# Patient Record
Sex: FEMALE | Race: OTHER | Employment: OTHER | URBAN - METROPOLITAN AREA
[De-identification: names, ages, dates, MRNs, and addresses within clinical notes are randomized per-mention and may not be internally consistent; named-entity substitution may affect disease eponyms.]

---

## 2021-04-08 PROBLEM — N32.81 OVERACTIVE BLADDER: Status: ACTIVE | Noted: 2018-12-11

## 2021-04-08 PROBLEM — Z86.010 HISTORY OF ADENOMATOUS POLYP OF COLON: Status: ACTIVE | Noted: 2017-01-10

## 2022-02-23 ENCOUNTER — HOSPITAL ENCOUNTER (OUTPATIENT)
Dept: PHYSICAL THERAPY | Age: 67
Discharge: HOME | End: 2022-02-23
Payer: MEDICARE

## 2022-02-23 PROCEDURE — 97110 THERAPEUTIC EXERCISES: CPT

## 2022-02-23 PROCEDURE — 97161 PT EVAL LOW COMPLEX 20 MIN: CPT

## 2022-02-23 NOTE — PROGRESS NOTES
PHYSICAL THERAPY - DAILY TREATMENT NOTE     Patient Name: Kati Reynoso        Date: 2022  : 1955   YES Patient  Verified  Visit #:     Insurance: Payor: Nick Paula / Plan: [place] MEDICARE PART A & B / Product Type: Medicare /      In time: 1100 Out time: 1150   Total Treatment Time: 50     Medicare/BCBS Time Tracking (below)   Total Timed Codes (min):  15 1:1 Treatment Time:  40     TREATMENT AREA =  Pain in left shoulder [M25.512]    SUBJECTIVE    Pain Level (on 0 to 10 scale):  4  / 10   Medication Changes/New allergies or changes in medical history, any new surgeries or procedures?     NO    If yes, update Summary List   Subjective Functional Status/Changes:  []  No changes reported       See POC         OBJECTIVE  Modalities Rationale:     decrease inflammation and decrease pain to improve patient's ability to perform ADL's w/o pain      min [] Estim, type/location:                                      []  att     []  unatt     []  w/US     []  w/ice    []  w/heat    min []  Mechanical Traction: type/lbs                   []  pro   []  sup   []  int   []  cont    []  before manual    []  after manual    min []  Ultrasound, settings/location:      min []  Iontophoresis w/ dexamethasone, location:                                               []  take home patch       []  in clinic   10 min [x]  Ice     []  Heat    location/position: Supine to left shoulder    min []  Vasopneumatic Device, press/temp:     min []  Other:    [x] Skin assessment post-treatment (if applicable):    [x]  intact    [x]  redness- no adverse reaction     []redness - adverse reaction:      15 min Therapeutic Exercise:  [x]  See flow sheet   Rationale:      increase ROM and increase strength to improve the patients ability to perform ADL's w/o pain      Billed With/As:   [x] TE   [] TA   [] Neuro   [] Self Care Patient Education: [x] Review HEP    [] Progressed/Changed HEP based on:   [] positioning   [] body mechanics   [] transfers   [] heat/ice application    [] other:        Other Objective/Functional Measures:    See POC  See TE Flow sheet     Post Treatment Pain Level (on 0 to 10) scale:   1  / 10     ASSESSMENT    Assessment/Changes in Function:     See POC     []  See Progress Note/Recertification   Patient will continue to benefit from skilled PT services to modify and progress therapeutic interventions, address functional mobility deficits, address ROM deficits, address strength deficits, analyze and address soft tissue restrictions, analyze and cue movement patterns, analyze and modify body mechanics/ergonomics and assess and modify postural abnormalities to attain remaining goals.       Progress toward goals / Updated goals:    See POC     PLAN    [x]  Upgrade activities as tolerated YES Continue plan of care   []  Discharge due to :    []  Other:      Therapist: Connor Barnes PT    Date: 2/23/2022 Time: 10:51 AM     Future Appointments   Date Time Provider Trev Ragsdale   2/23/2022 11:00 AM Lala Urena, PT [facility]   8/10/2022  8:40 AM Virgilio Willoughby NP [id number]

## 2022-02-23 NOTE — PROGRESS NOTES
[address] [place] [address] PHYSICAL THERAPY AT [address] [place] [address]  [address] Mag[address]anes [address], [address] ,[address], [address]  Phone: [phone]  Fax: [phone] / [address] [address] PHYSICAL THERAPY SERVICES  Patient Name: Irene Lewis : 1955   Medical   Diagnosis: Pain in left shoulder [M25.512] Treatment Diagnosis: L shoulder   Onset Date: 2022     Referral Source: Juan C Sotelo MD Start of Care [facility]): 2022   Prior Hospitalization: See medical history Provider #: [id number]   Prior Level of Function: Independent all ADL's   Comorbidities: Depression, Arthritis, HTN   Medications: Verified on Patient Summary List   The Plan of Care and following information is based on the information from the initial evaluation.   ==================================================================================  Assessment / key information:  Patient is a 77 y.o.  yo female with Dx of Pain in left shoulder [M25.512]. She states insidious onset of pain in January with pain in superior, shoulder progressing to pain down the left arm and deep into the joint. She regularly sees a chiropractor who adjusted her first rib, with no relief. Received a cortisone injection on 22 along with an Xray, which was negative. She reports her pain and mobility have significantly improved since receiving the injection. She currently rates her pain as 7/10 at worst, 4/10 at best.  She complains of difficulty and increased pain with raking, lifting/carrying objects with L UE overhead activities, lying on left side. Objective Findings:  Shoulder A/PROM: is WNL in all planes with pain in the last 10 degrees of motion for all planes, MMT:4-4-/5 grossly throughout RTC and scapular stabilizers. Mild +TTP left pec minor. Special Test: Neer's and Tanna Bone Impingement Test: (+).   Patient can benefit from PT interventions to improve strength, ROM, posture, flexibility, decrease pain, to facilitate ADLs & overall functional status.   ==================================================================================  Eval Complexity: History HIGH Complexity :3+ comorbidities / personal factors will impact the outcome/ POC ;  Examination  MEDIUM Complexity : 3 Standardized tests and measures addressing body structure, function, activity limitation and / or participation in recreation ; Presentation LOW Complexity : Stable, uncomplicated ;  Decision Making MEDIUM Complexity : FOTO score of 26-74; Overall Complexity LOW   Problem List: pain affecting function, decrease ROM, decrease strength, decrease ADL/ functional abilitiies, decrease activity tolerance, decrease flexibility/ joint mobility and other FOTO 56/100  Treatment Plan may include any combination of the following: Therapeutic exercise, Therapeutic activities, Neuromuscular re-education, Physical agent/modality, Gait/balance training, Manual therapy, Patient education, Self Care training and Functional mobility training  Patient / Family readiness to learn indicated by: asking questions, trying to perform skills and interest  Persons(s) to be included in education: patient (P)  Barriers to Learning/Limitations: None  Measures taken:    Patient Goal (s): \"Return to previous full use of left shoulder to enable performing daily tasks and gardening, housekeeping, etc.\"   Patient self reported health status: good  Rehabilitation Potential: good   Short Term Goals: To be accomplished in  2-3  weeks:  1) Patient to be independent and compliant with initial HEP to prevent further disability. 2) Patient will report decreased c/o pain to < or = 3/10 at worst with overhead activities and lifting to facilitate improved independence with ADL's and yardwork with manageable sx in the left shoulder. 3) Patient to increase GROC by +2 indicating improved functional mobility and indepence   Long Term Goals:  To be accomplished in  4-6  weeks:  1) Patient to be independent & compliant with progressive HEP in preparation for D/C.  2) Patient to demonstrate 4+/5 or greater in left shoulder/scapula to allow for return to PLF w/ pain  3.) Patient will improve FOTO score to>/= to 69 indicating improved functional mobility and [place] with ADL's. Frequency / Duration:   Patient to be seen  2-3  times per week for 4-6  weeks:  Patient / Caregiver education and instruction: self care, activity modification and exercises  G-Codes (GP): n/a  Therapist Signature: Renan Escobar PT Date: 8/53/5534   Certification Period: 2/23/22 to 5/23/22 Time: 10:52 AM   ===========================================================================================  I certify that the above Physical Therapy Services are being furnished while the patient is under my care. I agree with the treatment plan and certify that this therapy is necessary. Physician Signature:        Date:       Time:        Jose Raul Harper MD  Please sign and return to [facility] [place] or you may fax the signed copy to [phone]. Thank you.

## 2022-03-02 ENCOUNTER — HOSPITAL ENCOUNTER (OUTPATIENT)
Dept: PHYSICAL THERAPY | Age: 67
Discharge: HOME | End: 2022-03-02
Payer: MEDICARE

## 2022-03-02 PROCEDURE — 97110 THERAPEUTIC EXERCISES: CPT

## 2022-03-02 PROCEDURE — 97140 MANUAL THERAPY 1/> REGIONS: CPT

## 2022-03-02 NOTE — PROGRESS NOTES
PHYSICAL THERAPY - DAILY TREATMENT NOTE     Patient Name: Corwin France        Date: 3/2/2022  : 1955   YES Patient  Verified  Visit #:     Insurance: Payor: Liza Nones / Plan: [place] MEDICARE PART A & B / Product Type: Medicare /      In time: 176 Out time: 930   Total Treatment Time: 60     Medicare/BCBS Time Tracking (below)   Total Timed Codes (min):  50 1:1 Treatment Time:  45     TREATMENT AREA =  Pain in left shoulder [M25.512]    SUBJECTIVE    Pain Level (on 0 to 10 scale):  3  / 10   Medication Changes/New allergies or changes in medical history, any new surgeries or procedures? NO    If yes, update Summary List   Subjective Functional Status/Changes:  []  No changes reported       Functional improvements: \"For the first few days since my last visit it felt great. The past day or two it has started to ache again but not as bad as before. \"  Functional impairments: Decreased strength and ROM affecting ADL's         OBJECTIVE  Modalities Rationale:     decrease inflammation and decrease pain to improve patient's ability to perform ADL's w less pain      min [] Estim, type/location:                                      []  att     []  unatt     []  w/US     []  w/ice    []  w/heat    min []  Mechanical Traction: type/lbs                   []  pro   []  sup   []  int   []  cont    []  before manual    []  after manual    min []  Ultrasound, settings/location:      min []  Iontophoresis w/ dexamethasone, location:                                               []  take home patch       []  in clinic   10 min [x]  Ice     []  Heat    location/position: Supine to L shoulder    min []  Vasopneumatic Device, press/temp:  If using vaso (only need to measure limb vaso being performed on)      pre-treatment girth :       post-treatment girth :       measured at (landmark location) :       min []  Other:    [x] Skin assessment post-treatment (if applicable):    [x]  intact    [x]  redness- no adverse reaction     []redness - adverse reaction:      35/5 min Therapeutic Exercise:  [x]  See flow sheet   Rationale:      increase ROM and increase strength to improve the patients ability to perform ADL's and overhead activities     10 min Manual Therapy: Technique:      [x] S/DTM []IASTM []PROM [] Passive Stretching   [x]manual TPR    []Jt manipulation:Gr I [] II []  III [] IV[] V[]  Treatment Area: Pec minor, [place]    Rationale:      decrease pain, increase ROM and increase tissue extensibility to improve patient's ability to perform ADL's w/o pain. The manual therapy interventions were performed at a separate and distinct time from the therapeutic activities interventions. Billed With/As:   [] TE   [] TA   [] Neuro   [] Self Care Patient Education: [x] Review HEP    [] Progressed/Changed HEP based on:   [] positioning   [] body mechanics   [] transfers   [] heat/ice application    [] other:        Other Objective/Functional Measures:    Initiated therex per flow sheet     Post Treatment Pain Level (on 0 to 10) scale:   1  / 10     ASSESSMENT    Assessment/Changes in Function:     Patient tolerated new therex well without increased symptoms and good control, minimal verbal cues for technique throughout session. Patient reports improved \"pinching\" sensation following treatment session. Significant improvement in soft tissue mobility and tightness following MT.     []  See Progress Note/Recertification   Patient will continue to benefit from skilled PT services to modify and progress therapeutic interventions, address functional mobility deficits, address ROM deficits, address strength deficits, analyze and address soft tissue restrictions, analyze and cue movement patterns, analyze and modify body mechanics/ergonomics and assess and modify postural abnormalities to attain remaining goals.       Progress toward goals / Updated goals:    Progressing towards newly established goals     PLAN    [x]  Upgrade activities as tolerated YES Continue plan of care   []  Discharge due to :    []  Other:      Therapist: Cinthya Farnco PT    Date: 3/2/2022 Time: 8:03 AM     Future Appointments   Date Time Provider Trev Ragsdale   3/2/2022  8:30 AM Renee Szymanski, PT [facility]   3/7/2022  8:30 AM Renee Szymanski, PT [facility]   3/9/2022  8:30 AM Renee Szymanski, PT [facility]   3/14/2022  8:30 AM Renee Szymanski, PT [facility]   3/16/2022  8:30 AM Renee Szymanski, PT [facility]   3/21/2022  8:30 AM Renee Szymanski, PT [facility]   3/23/2022  8:30 AM Renee Szymanski, PT [facility]   3/28/2022  8:30 AM Renee Szymanski, PT [facility]   3/30/2022  8:30 AM Renee Szymanski, PT [facility]   4/4/2022  8:30 AM Renee Szymanski, PT [facility]   4/6/2022  8:30 AM Renee Szymanski, PT [facility]   8/10/2022  8:40 AM Rama Babb, NP [address] [facility]

## 2022-03-07 ENCOUNTER — HOSPITAL ENCOUNTER (OUTPATIENT)
Dept: PHYSICAL THERAPY | Age: 67
Discharge: HOME | End: 2022-03-07
Payer: MEDICARE

## 2022-03-07 PROCEDURE — 97110 THERAPEUTIC EXERCISES: CPT

## 2022-03-07 PROCEDURE — 97112 NEUROMUSCULAR REEDUCATION: CPT

## 2022-03-07 PROCEDURE — 97140 MANUAL THERAPY 1/> REGIONS: CPT

## 2022-03-07 NOTE — PROGRESS NOTES
PHYSICAL THERAPY - DAILY TREATMENT NOTE     Patient Name: Maira Escobar        Date: 3/7/2022  : 1955   YES Patient  Verified  Visit #:   3   of   12  Insurance: Payor: Dakota Avina / Plan: [place] MEDICARE PART A & B / Product Type: Medicare /      In time: 230 Out time: 930   Total Treatment Time: 60     Medicare/BCBS Time Tracking (below)   Total Timed Codes (min):  60 1:1 Treatment Time:  50     TREATMENT AREA =  Pain in left shoulder [M25.512]    SUBJECTIVE    Pain Level (on 0 to 10 scale):  4  / 10   Medication Changes/New allergies or changes in medical history, any new surgeries or procedures? NO    If yes, update Summary List   Subjective Functional Status/Changes:  []  No changes reported       Functional improvements: \"I think I had a setback. On Thursday I was sitting at my desk and I pushed down on the arm rest to stand up and I heard a pop. I was not able to lift my arm at all for the entire day and I've had a lot more pain in the left shoulder since.   Functional impairments: Increased pain affecting ADL's and ROM         OBJECTIVE  Modalities Rationale:     decrease inflammation and decrease pain to improve patient's ability to perform ADL's w/ less pain      min [] Estim, type/location:                                      []  att     []  unatt     []  w/US     []  w/ice    []  w/heat    min []  Mechanical Traction: type/lbs                   []  pro   []  sup   []  int   []  cont    []  before manual    []  after manual    min []  Ultrasound, settings/location:      min []  Iontophoresis w/ dexamethasone, location:                                               []  take home patch       []  in clinic   10 min [x]  Ice     []  Heat    location/position: Supine to left shoulder    min []  Vasopneumatic Device, press/temp:  If using vaso (only need to measure limb vaso being performed on)      pre-treatment girth :       post-treatment girth :       measured at (landmark location) :       min []  Other:    [x] Skin assessment post-treatment (if applicable):    [x]  intact    [x]  redness- no adverse reaction     []redness - adverse reaction:      15 min Therapeutic Exercise:  [x]  See flow sheet   Rationale:      increase ROM and increase strength to improve the patients ability to perform ADL's and use of UE overhead     25 min Neuromuscular Re-ed: [x]  See flow sheet   Rationale:      increase ROM, increase strength and improve coordination to improve the patients ability to perform ADL's with good scapular control and stability when reaching away from body, especially with driving and overhead activities     10 min Manual Therapy: Technique:      [] S/DTM []IASTM [x]PROM [x] Passive Stretching   []manual TPR    [x]Jt manipulation:Gr I [] II []  III [x] IV[] V[]  Treatment Area: Post/inf capsule. PROM all planes    Rationale:      decrease pain, increase ROM and increase tissue extensibility to improve patient's ability to perform ADL's and reach overhead. The manual therapy interventions were performed at a separate and distinct time from the therapeutic activities interventions. Billed With/As:   [x] TE   [] TA   [x] Neuro   [] Self Care Patient Education: [x] Review HEP    [] Progressed/Changed HEP based on:   [] positioning   [] body mechanics   [] transfers   [] heat/ice application    [] other:        Other Objective/Functional Measures:    Assessed A/PROM: remains the same as initial eval with the exception of Active scaption pain with eccentric control from 55 deg to 0 and IR 55 deg P!. +TTP at supraspinatus    Following MT IR increased to 80 deg no pain     Post Treatment Pain Level (on 0 to 10) scale:   0  / 10     ASSESSMENT    Assessment/Changes in Function:     Significant improvement in active ROM w/o pain following treatment session. Patient able to perform all activities w/o symptoms and good scapular control/posture.      []  See Progress Note/Recertification   Patient will continue to benefit from skilled PT services to modify and progress therapeutic interventions, address functional mobility deficits, address ROM deficits, address strength deficits, analyze and address soft tissue restrictions, analyze and cue movement patterns, analyze and modify body mechanics/ergonomics and assess and modify postural abnormalities to attain remaining goals. Progress toward goals / Updated goals:    1) Patient to be independent and compliant with initial HEP to prevent further disability. Good progress, continues to require min vc's for technique  2) Patient will report decreased c/o pain to < or = 3/10 at worst with overhead activities and lifting to facilitate improved independence with ADL's and yardwork with manageable sx in the left shoulder. Good progress prior to most recent flare up  3) Patient to increase GROC by +2 indicating improved functional mobility and indepence Good progress per patient report despite recent flare up.      PLAN    [x]  Upgrade activities as tolerated YES Continue plan of care   []  Discharge due to :    []  Other:      Therapist: Max Fernandes PT    Date: 3/7/2022 Time: 7:59 AM     Future Appointments   Date Time Provider Trev Ragsdale   3/7/2022  8:30 AM Kya Ripa, PT [facility]   3/9/2022  8:30 AM Kya Ripa, PT [facility]   3/14/2022  8:30 AM Kya Ripa, PT [facility]   3/16/2022  8:30 AM Kya Ripa, PT [facility]   3/21/2022  8:30 AM Kya Ripa, PT [facility]   3/23/2022  8:30 AM Kya Ripa, PT [facility]   3/28/2022  8:30 AM Kya Ripa, PT [facility]   3/30/2022  8:30 AM Kya Ripa, PT [facility]   4/4/2022  8:30 AM Kya Ripa, PT [facility]   4/6/2022  8:30 AM Kya Ny, PT [facility]   8/10/2022  8:40 AM Baron Babb, NP [address] [employer]

## 2022-03-09 ENCOUNTER — HOSPITAL ENCOUNTER (OUTPATIENT)
Dept: PHYSICAL THERAPY | Age: 67
Discharge: HOME | End: 2022-03-09
Payer: MEDICARE

## 2022-03-09 PROCEDURE — 97530 THERAPEUTIC ACTIVITIES: CPT

## 2022-03-09 PROCEDURE — 97110 THERAPEUTIC EXERCISES: CPT

## 2022-03-09 PROCEDURE — 97140 MANUAL THERAPY 1/> REGIONS: CPT

## 2022-03-09 PROCEDURE — 97112 NEUROMUSCULAR REEDUCATION: CPT

## 2022-03-09 NOTE — PROGRESS NOTES
PHYSICAL THERAPY - DAILY TREATMENT NOTE     Patient Name: Harman Tran        Date: 3/9/2022  : 1955   YES Patient  Verified  Visit #:     Insurance: Payor: Dru Boogie / Plan: [place] MED[place] & B / Product Type: Medicare /      In time: 101 Out time: 176   Total Treatment Time: 65     Medicare/BCBS Time Tracking (below)   Total Timed Codes (min):  65 1:1 Treatment Time:  55     TREATMENT AREA =  Pain in left shoulder [M25.512]    SUBJECTIVE    Pain Level (on 0 to 10 scale):  0  / 10   Medication Changes/New allergies or changes in medical history, any new surgeries or procedures? NO    If yes, update Summary List   Subjective Functional Status/Changes:  []  No changes reported       Functional improvements: \"I had no pain yesterday at all.   I woke up with a little stiffness but no pain and I was able to do things around the house without difficulty\"  Functional impairments: Decreased strength affecting shoulder mechanics and ADL's         OBJECTIVE  Modalities Rationale:     decrease inflammation and decrease pain to improve patient's ability to perform ADL's      min [] Estim, type/location:                                      []  att     []  unatt     []  w/US     []  w/ice    []  w/heat    min []  Mechanical Traction: type/lbs                   []  pro   []  sup   []  int   []  cont    []  before manual    []  after manual    min []  Ultrasound, settings/location:      min []  Iontophoresis w/ dexamethasone, location:                                               []  take home patch       []  in clinic   10 min [x]  Ice     []  Heat    location/position: Supine to L shoulder    min []  Vasopneumatic Device, press/temp:  If using vaso (only need to measure limb vaso being performed on)      pre-treatment girth :       post-treatment girth :       measured at (landmark location) :       min []  Other:    [x] Skin assessment post-treatment (if applicable):    [x]  intact    [x] redness- no adverse reaction     []redness - adverse reaction:      15 min Therapeutic Exercise:  [x]  See flow sheet   Rationale:      increase ROM and increase strength to improve the patients ability to reach overhead     15 min Therapeutic Activity: [x]  See flow sheet   Rationale:      increase ROM, increase strength and improve coordination to improve the patients ability to reach overhead and dress without pain     15 min Neuromuscular Re-ed: [x]  See flow sheet   Rationale:      increase strength, improve coordination and increase proprioception to improve the patients ability to perform ADL's on extended UE with good scapular stability and proprioception. 10 min Manual Therapy: Technique:      [] S/DTM []IASTM [x]PROM [x] Passive Stretching   [x]manual TPR    [x]Jt manipulation:Gr I [] II []  III [x] IV[] V[]  Treatment Area:  PROM following jt mobs to inf/post capsule, TPR to supraspinatus   Rationale:      decrease pain, increase ROM and increase tissue extensibility to improve patient's ability to perform ADL's w/o pain and good shoulder mechanics. The manual therapy interventions were performed at a separate and distinct time from the therapeutic activities interventions. Billed With/As:   [] TE   [] TA   [] Neuro   [] Self Care Patient Education: [x] Review HEP    [] Progressed/Changed HEP based on:   [] positioning   [] body mechanics   [] transfers   [] heat/ice application    [] other:        Other Objective/Functional Measures: Added Supine KB rotation for scapular stabilization and increased weights/reps per flow sheet. Added standing scaption     Post Treatment Pain Level (on 0 to 10) scale:   0  / 10     ASSESSMENT    Assessment/Changes in Function:     Patient tolerated new therex well with only mild 'pinching pain' with fatigue but good technique and no substitution.   Patient continues to make excellent progress with ROM and strength allowing for improved functional mobility at home.     []  See Progress Note/Recertification   Patient will continue to benefit from skilled PT services to modify and progress therapeutic interventions, address functional mobility deficits, address ROM deficits, address strength deficits, analyze and address soft tissue restrictions, analyze and cue movement patterns, analyze and modify body mechanics/ergonomics and assess and modify postural abnormalities to attain remaining goals. Progress toward goals / Updated goals:    1) Patient to be independent and compliant with initial HEP to prevent further disability. MET  2) Patient will report decreased c/o pain to < or = 3/10 at worst with overhead activities and lifting to facilitate improved independence with ADL's and yardwork with manageable sx in the left shoulder.  MET  3) Patient to increase GROC by +2 indicating improved functional mobility and indepence MET per patient report of improved functional mobility     PLAN    [x]  Upgrade activities as tolerated YES Continue plan of care   []  Discharge due to :    []  Other:      Therapist: Evita Su PT    Date: 3/9/2022 Time: 8:03 AM     Future Appointments   Date Time Provider Trev Ragsdale   3/9/2022  8:30 AM Frank Tejas, PT [facility]   3/14/2022  8:30 AM Frank Tejas, PT [facility]   3/16/2022  8:30 AM Frank Tejas, PT [facility]   3/21/2022  8:30 AM Frank Tejas, PT [facility]   3/23/2022  8:30 AM Frank Tejas, PT [facility]   3/28/2022  8:30 AM Frank Tejas, PT [facility]   3/30/2022  8:30 AM Frank Tejas, PT [facility]   4/4/2022  8:30 AM Frank Tejas, PT [facility]   4/6/2022  8:30 AM Frank Tejas, PT [facility]   8/10/2022  8:40 AM [facility], FARTUN White

## 2022-03-14 ENCOUNTER — HOSPITAL ENCOUNTER (OUTPATIENT)
Dept: PHYSICAL THERAPY | Age: 67
Discharge: HOME | End: 2022-03-14
Payer: MEDICARE

## 2022-03-14 PROCEDURE — 97110 THERAPEUTIC EXERCISES: CPT

## 2022-03-14 PROCEDURE — 97530 THERAPEUTIC ACTIVITIES: CPT

## 2022-03-14 PROCEDURE — 97140 MANUAL THERAPY 1/> REGIONS: CPT

## 2022-03-14 PROCEDURE — 97112 NEUROMUSCULAR REEDUCATION: CPT

## 2022-03-14 NOTE — PROGRESS NOTES
PHYSICAL THERAPY - DAILY TREATMENT NOTE     Patient Name: Ed Chang        Date: 3/14/2022  : 1955   YES Patient  Verified  Visit #:      of   12  Insurance: Payor: Louise Yee / Plan: [place] MEDICARE PART A & B / Product Type: Medicare /      In time: 196 Out time: 774   Total Treatment Time: 65     Medicare/BCBS Time Tracking (below)   Total Timed Codes (min):  65 1:1 Treatment Time:  55     TREATMENT AREA =  Pain in left shoulder [M25.512]    SUBJECTIVE    Pain Level (on 0 to 10 scale):  0  / 10   Medication Changes/New allergies or changes in medical history, any new surgeries or procedures? NO    If yes, update Summary List   Subjective Functional Status/Changes:  []  No changes reported       Functional improvements: \"It's so much better. It feels stronger and I was able to do some yard work without any pain. \"  Functional impairments: Occasional pinching with reaching overhead and behind         OBJECTIVE  Modalities Rationale:     decrease inflammation and decrease pain to improve patient's ability to perform ADL's w/ less pain      min [] Estim, type/location:                                      []  att     []  unatt     []  w/US     []  w/ice    []  w/heat    min []  Mechanical Traction: type/lbs                   []  pro   []  sup   []  int   []  cont    []  before manual    []  after manual    min []  Ultrasound, settings/location:      min []  Iontophoresis w/ dexamethasone, location:                                               []  take home patch       []  in clinic   10 min [x]  Ice     []  Heat    location/position: Supine to L shoulder    min []  Vasopneumatic Device, press/temp:  If using vaso (only need to measure limb vaso being performed on)      pre-treatment girth :       post-treatment girth :       measured at (landmark location) :       min []  Other:    [x] Skin assessment post-treatment (if applicable):    [x]  intact    [x]  redness- no adverse reaction     []redness - adverse reaction:      15 min Therapeutic Exercise:  [x]  See flow sheet   Rationale:      increase ROM and increase strength to improve the patients ability to reach overhead w/ household activities. 15 min Therapeutic Activity: [x]  See flow sheet   Rationale:      increase ROM and increase strength to improve the patients ability to perform yard work with proper mechanics and decreased pain     15 min Neuromuscular Re-ed: [x]  See flow sheet   Rationale:      increase ROM, increase strength, improve coordination and increase proprioception to improve the patients ability to perform ADL's w/ proper mechanics and limited pain when reaching overhead. 10 min Manual Therapy: Technique:      [] S/DTM []IASTM [x]PROM [x] Passive Stretching   [x]manual TPR    []Jt manipulation:Gr I [] II []  III [] IV[] V[]  Treatment Area:  TPR to supraspinatus, PROM all planes   Rationale:      decrease pain, increase ROM and increase tissue extensibility to improve patient's ability to perform overhead reaching. .The manual therapy interventions were performed at a separate and distinct time from the therapeutic activities interventions. Billed With/As:   [x] TE   [x] TA   [x] Neuro   [] Self Care Patient Education: [x] Review HEP    [] Progressed/Changed HEP based on:   [] positioning   [] body mechanics   [] transfers   [] heat/ice application    [] other:        Other Objective/Functional Measures:    Progressed activities and weights per flow sheet     Post Treatment Pain Level (on 0 to 10) scale:   0  / 10     ASSESSMENT    Assessment/Changes in Function:     Patient continues to make excellent progress in her strength and active pain free ROM. Requires occasional vc's for scapular engagement with activities to decrease pain in the left shoulder.      []  See Progress Note/Recertification   Patient will continue to benefit from skilled PT services to modify and progress therapeutic interventions, address functional mobility deficits, address ROM deficits, address strength deficits, analyze and address soft tissue restrictions, analyze and cue movement patterns, analyze and modify body mechanics/ergonomics and assess and modify postural abnormalities to attain remaining goals. Progress toward goals / Updated goals: All STG's met  LT) Patient to demonstrate 4+/5 or greater in left shoulder/scapula to allow for return to PLF w/ pain Good progress noted by increased repetitions and tolerance to increased resistance.        PLAN    [x]  Upgrade activities as tolerated YES Continue plan of care   []  Discharge due to :    []  Other:      Therapist: Kalin Matthews PT    Date: 3/14/2022 Time: 8:04 AM     Future Appointments   Date Time Provider Trev Ragsdale   3/14/2022  8:30 AM Buzz Flatter, PT [facility]   3/16/2022  8:30 AM Buzz Flatter, PT [facility]   3/21/2022  8:30 AM Buzz Flatter, PT [facility]   3/23/2022  8:30 AM Buzz Flatter, PT [facility]   3/28/2022  8:30 AM Buzz Flatter, PT [facility]   3/30/2022  8:30 AM Buzz Flatter, PT [facility]   2022  8:30 AM Buzz Flatter, PT [facility]   2022  8:30 AM Buzz Flatter, PT [facility]   8/10/2022  8:40 AM Polizos, Arnold Leventhal, NP [place]

## 2022-03-16 ENCOUNTER — HOSPITAL ENCOUNTER (OUTPATIENT)
Dept: PHYSICAL THERAPY | Age: 67
Discharge: HOME | End: 2022-03-16
Payer: MEDICARE

## 2022-03-16 PROCEDURE — 97110 THERAPEUTIC EXERCISES: CPT

## 2022-03-16 PROCEDURE — 97140 MANUAL THERAPY 1/> REGIONS: CPT

## 2022-03-16 PROCEDURE — 97530 THERAPEUTIC ACTIVITIES: CPT

## 2022-03-16 NOTE — PROGRESS NOTES
PHYSICAL THERAPY - DAILY TREATMENT NOTE     Patient Name: Leatha Vernon        Date: 3/16/2022  : 1955   YES Patient  Verified  Visit #:     Insurance: Payor: Clau Hercules / Plan: [place] MEDICARE PART A & B / Product Type: Medicare /      In time: 861 Out time: 930   Total Treatment Time: 60     Medicare/BCBS Time Tracking (below)   Total Timed Codes (min):  60 1:1 Treatment Time:  50     TREATMENT AREA =  Pain in left shoulder [M25.512]    SUBJECTIVE    Pain Level (on 0 to 10 scale):  1  / 10   Medication Changes/New allergies or changes in medical history, any new surgeries or procedures? NO    If yes, update Summary List   Subjective Functional Status/Changes:  []  No changes reported       Functional improvements:  \"It felt good after Monday but it's been a little sore in my neck\"  Functional impairments: Increased pain, decreased strength affecting ADL's         OBJECTIVE  Modalities Rationale:     decrease inflammation and decrease pain to improve patient's ability to perform ADL's w/o pain      min [] Estim, type/location:                                      []  att     []  unatt     []  w/US     []  w/ice    []  w/heat    min []  Mechanical Traction: type/lbs                   []  pro   []  sup   []  int   []  cont    []  before manual    []  after manual    min []  Ultrasound, settings/location:      min []  Iontophoresis w/ dexamethasone, location:                                               []  take home patch       []  in clinic   10 min [x]  Ice     []  Heat    location/position: Supine to Left shoulder    min []  Vasopneumatic Device, press/temp:  If using vaso (only need to measure limb vaso being performed on)      pre-treatment girth :       post-treatment girth :       measured at (landmark location) :       min []  Other:    [x] Skin assessment post-treatment (if applicable):    [x]  intact    [x]  redness- no adverse reaction     []redness - adverse reaction:      15 min Therapeutic Exercise:  [x]  See flow sheet   Rationale:      increase ROM and increase strength to improve the patients ability to perform overhead activities     15 min Therapeutic Activity: [x]  See flow sheet   Rationale:      increase ROM and increase strength to improve the patients ability to perform dressing ADL's     10NB min Neuromuscular Re-ed: [x]  See flow sheet   Rationale:      increase ROM, increase strength and improve coordination to improve the patients ability to perform yard work     10 min Manual Therapy: Technique:      [x] S/DTM []IASTM []PROM [x] Passive Stretching   [x]manual TPR    [x]Jt manipulation:Gr I [] II []  III [x] IV[] V[]  Treatment Area:  TPR to left UT, post/inf jt mobs, followed by PROM to left shoulder   Rationale:      decrease pain, increase ROM and increase tissue extensibility to improve patient's ability to perform ADL's w/o pain. The manual therapy interventions were performed at a separate and distinct time from the therapeutic activities interventions. Billed With/As:   [] TE   [] TA   [] Neuro   [] Self Care Patient Education: [x] Review HEP    [] Progressed/Changed HEP based on:   [] positioning   [] body mechanics   [] transfers   [] heat/ice application    [] other:        Other Objective/Functional Measures:    Held standing ER/IR and scaption secondary to increased fatigue and over recruitment of UT with exercises/activities. Will resume n/v   Post Treatment Pain Level (on 0 to 10) scale:   0  / 10     ASSESSMENT    Assessment/Changes in Function:     Increased fatigue with therex and increased vc's for decreasing UT recruitment with standing shoulder activities.   Continues to make good gains in AROM and pain free activities at home.     []  See Progress Note/Recertification   Patient will continue to benefit from skilled PT services to modify and progress therapeutic interventions, address functional mobility deficits, address ROM deficits, address strength deficits, analyze and address soft tissue restrictions, analyze and cue movement patterns, analyze and modify body mechanics/ergonomics and assess and modify postural abnormalities to attain remaining goals.       Progress toward goals / Updated goals:    Good progress towards all goals     PLAN    [x]  Upgrade activities as tolerated YES Continue plan of care   []  Discharge due to :    [x]  Other: Reassess n/v     Therapist: Yudelka Muñoz PT    Date: 3/16/2022 Time: 8:05 AM     Future Appointments   Date Time Provider Trev Ragsdale   3/16/2022  8:30 AM Wilhelmena Owen, PT [facility]   3/21/2022  8:30 AM Wilhelmena Owen, PT [facility]   3/23/2022  8:30 AM Wilquocmena Owen, PT [facility]   3/28/2022  8:30 AM Wilhelmena Owen, PT [facility]   3/30/2022  8:30 AM Wilhelmena Owen, PT [facility]   4/4/2022  8:30 AM Wilhelmena Owen, PT [facility]   4/6/2022  8:30 AM Wilquocmena Owen, PT [facility]   8/10/2022  8:40 AM Nikia Babb, NP [id number]

## 2022-03-21 ENCOUNTER — HOSPITAL ENCOUNTER (OUTPATIENT)
Dept: PHYSICAL THERAPY | Age: 67
Discharge: HOME | End: 2022-03-21
Payer: MEDICARE

## 2022-03-21 PROCEDURE — 97140 MANUAL THERAPY 1/> REGIONS: CPT

## 2022-03-21 PROCEDURE — 97530 THERAPEUTIC ACTIVITIES: CPT

## 2022-03-21 PROCEDURE — 97112 NEUROMUSCULAR REEDUCATION: CPT

## 2022-03-21 PROCEDURE — 97110 THERAPEUTIC EXERCISES: CPT

## 2022-03-21 NOTE — PROGRESS NOTES
[address] [place] [address] PHYSICAL THERAPY AT [facility] [address] [facility] [facility], [place], [address] -   Phone: [phone]  Fax: [phone] OF CARE/RECERTIFICATION FOR PHYSICAL THERAPY          Patient Name: Ruy Griffiths : 1955   Treatment/Medical Diagnosis: Pain in left shoulder [M25.512]   Onset Date: 2022    Referral Source: Kurt Davis MD Start [facility]): 22   Prior Hospitalization: See Medical History Provider #: [id number]   Prior Level of Function: Independent all ADL's   Comorbidities: Depression, Arthritis, HTN   Medications: Verified on Patient Summary List   Visits from [facility]: 7 Missed Visits: 0     SUMMARY OF TREATMENT  Instruction in HEP, manual therapy involving joint mobilization and STM/TPR, Therex for strengthening and neuromuscular reducation, postural re education, modalities including cryotherapy. CURRENT STATUS  Ms. Kanchan Duong has made excellent gains in her physical therapy, consistently reporting improved functional mobility and decreased pain. She continues to report pain with overuse of the left shoulder and decreased endurance. She is able to achieve full pain free AROM following manual treatment and a 70% improvement in overall functional independence. She currently has 4-4+/5 in scapular and left shoulder strength. Previous Goals:  1) Patient to be independent and compliant with initial HEP to prevent further disability. 2) Patient will report decreased c/o pain to < or = 3/10 at worst with overhead activities and lifting to facilitate improved independence with ADL's and yardwork with manageable sx in the left shoulder. 3) Patient to increase GROC by +2 indicating improved functional mobility and indepence     Prior Level/Current Level:  1) Prior Level: N/A   Current Level: independent with current HEP   Goal Met? yes  2) Prior Level: 7/10   Current Level: 0-5/10   Goal Met?  Partially Met  3) Prior Level: n/a   Current Level: 70% overall improvement   Goal Met? yes      Problem List: pain affecting function, decrease ROM, decrease strength, decrease ADL/ functional abilitiies, decrease activity tolerance and decrease flexibility/ joint mobility   Treatment Plan may include any combination of the following: Therapeutic exercise, Therapeutic activities, Neuromuscular re-education, Physical agent/modality, Manual therapy, Patient education, Self Care training and Functional mobility training  Patient Goal(s) has been updated and includes:      Goals for this certification period include and are to be achieved in   2-4  weeks:  1) Patient to be independent & compliant with progressive HEP in preparation for D/C.  2) Patient to demonstrate 4+/5 or greater in left shoulder/scapula to allow for return to PLF w/ pain  3.) Patient will improve FOTO score to>/= to 69 indicating improved functional mobility and [place] with ADL's. Frequency / Duration:   Patient to be seen   2   times per week for   2-4    weeks:    Assessments/Recommendations: Recommend continued PT to allow patient to achieve all LTG's  If you have any questions/comments please contact us directly at [phone]. Thank you for allowing us to assist in the care of your patient.     Therapist Signature: Chaim Sykes PT Date: 8/29/2193   Certification Period:  Reporting Period: 3/21/22 to 4/22/21 2/23/22 to 3/21/22 Time: 8:01 AM   NOTE TO PHYSICIAN:  PLEASE COMPLETE THE ORDERS BELOW AND FAX TO   [facility] Physical Therapy: [phone]  If you are unable to process this request in 24 hours please contact our office: [phone]    ___ I have read the above report and request that my patient continue as recommended.   ___ I have read the above report and request that my patient continue therapy with the following changes/special instructions:_________________________________________________________   ___ I have read the above report and request that my patient be discharged from therapy.      Physician Signature:        Date:            Kayleigh Winters MD  Time:

## 2022-03-21 NOTE — PROGRESS NOTES
PHYSICAL THERAPY - DAILY TREATMENT NOTE     Patient Name: Ruy Res        Date: 3/21/2022  : 1955   YES Patient  Verified  Visit #:     Insurance: Payor: Pramod Vital / Plan: [place] MED[place]ARE PART [place] & B / Product Type: Medicare /      In time: 913 Out time: 145   Total Treatment Time: 65     Medicare/BCBS Time Tracking (below)   Total Timed Codes (min):  65 1:1 Treatment Time:  55     TREATMENT AREA =  Pain in left shoulder [M25.512]    SUBJECTIVE    Pain Level (on 0 to 10 scale):  2  / 10   Medication Changes/New allergies or changes in medical history, any new surgeries or procedures? NO    If yes, update Summary List   Subjective Functional Status/Changes:  []  No changes reported       Functional improvements: \"It's been really achy the last two days. Friday I did about 3 hours of trimming. \"  Functional impairments: Decreased endurance, increased pain with ADL's         OBJECTIVE  Modalities Rationale:     decrease inflammation and decrease pain to improve patient's ability to perform ADL's w/o pain      min [] Estim, type/location:                                      []  att     []  unatt     []  w/US     []  w/ice    []  w/heat    min []  Mechanical Traction: type/lbs                   []  pro   []  sup   []  int   []  cont    []  before manual    []  after manual    min []  Ultrasound, settings/location:      min []  Iontophoresis w/ dexamethasone, location:                                               []  take home patch       []  in clinic   10 min [x]  Ice     []  Heat    location/position: Supine to left shoulder    min []  Vasopneumatic Device, press/temp:  If using vaso (only need to measure limb vaso being performed on)      pre-treatment girth :       post-treatment girth :       measured at (landmark location) :       min []  Other:    [x] Skin assessment post-treatment (if applicable):    [x]  intact    [x]  redness- no adverse reaction     []redness - adverse reaction: 15 min Therapeutic Exercise:  [x]  See flow sheet   Rationale:      increase ROM and increase strength to improve the patients ability to perform dressing activities and reach overhead w/o pain     10 min Therapeutic Activity: [x]  See flow sheet   Rationale:      increase ROM and increase strength to improve the patients ability to perform yard work w/o pain     15 min Neuromuscular Re-ed: [x]  See flow sheet   Rationale:      increase strength, improve coordination and increase proprioception to improve the patients ability to perform yard work and overhead activities with proper mechanics and scapular stability     15 min Manual Therapy: Technique:      [x] S/DTM []IASTM []PROM [x] Passive Stretching   [x]manual TPR    [x]Jt manipulation:Gr I [] II []  III [x] IV[] V[]  Treatment Area:  TPR to left UT/supraspinatus, contract relax for IR, Post/inf mobs followed by PROM   Rationale:      decrease pain, increase ROM and increase tissue extensibility to improve patient's ability to perform ADL's w/ full ROM and no pain. The manual therapy interventions were performed at a separate and distinct time from the therapeutic activities interventions. Billed With/As:   [] TE   [] TA   [] Neuro   [] Self Care Patient Education: [x] Review HEP    [] Progressed/Changed HEP based on:   [] positioning   [] body mechanics   [] transfers   [] heat/ice application    [] other:        Other Objective/Functional Measures: Added sup hrz abd and PNF D2 in standing.   Resumed previous exercises     Post Treatment Pain Level (on 0 to 10) scale:   0  / 10     ASSESSMENT    Assessment/Changes in Function:     See Progress note     [x]  See Progress Note/Recertification   Patient will continue to benefit from skilled PT services to modify and progress therapeutic interventions, address functional mobility deficits, address ROM deficits, address strength deficits, analyze and address soft tissue restrictions, analyze and cue movement patterns, analyze and modify body mechanics/ergonomics and assess and modify postural abnormalities to attain remaining goals.       Progress toward goals / Updated goals:    See PN     PLAN    [x]  Upgrade activities as tolerated YES Continue plan of care   []  Discharge due to :    []  Other:      Therapist: Reina Henriquez PT    Date: 3/21/2022 Time: 7:58 AM     Future Appointments   Date Time Provider Trev Ragsdale   3/21/2022  8:30 AM Mercedes Delatorre, PT [facility]   3/23/2022  8:30 AM Mercedes Delatorre, PT [facility]   3/28/2022  8:30 AM Mercedes Delatorre, PT [facility]   3/30/2022  8:30 AM Mercedes Delatorre, PT [facility]   4/4/2022  8:30 AM Mercedes Delatorre, PT [facility]   4/6/2022  8:30 AM Mercedes Delatorre, PT [facility]   8/10/2022  8:40 AM Yessica Babb, NP [address] [facility]

## 2022-03-23 ENCOUNTER — HOSPITAL ENCOUNTER (OUTPATIENT)
Dept: PHYSICAL THERAPY | Age: 67
Discharge: HOME | End: 2022-03-23
Payer: MEDICARE

## 2022-03-23 PROCEDURE — 97530 THERAPEUTIC ACTIVITIES: CPT

## 2022-03-23 PROCEDURE — 97112 NEUROMUSCULAR REEDUCATION: CPT

## 2022-03-23 PROCEDURE — 97110 THERAPEUTIC EXERCISES: CPT

## 2022-03-23 PROCEDURE — 97140 MANUAL THERAPY 1/> REGIONS: CPT

## 2022-03-23 NOTE — PROGRESS NOTES
PHYSICAL THERAPY - DAILY TREATMENT NOTE     Patient Name: Kati Reynoso        Date: 3/23/2022  : 1955   YES Patient  Verified  Visit #:     Insurance: Payor: Nick Paula / Plan: [place] MEDICARE PART A & B / Product Type: Medicare /      In time: 193 Out time: 683   Total Treatment Time: 65     Medicare/BCBS Time Tracking (below)   Total Timed Codes (min):  65 1:1 Treatment Time:  55     TREATMENT AREA =  Pain in left shoulder [M25.512]    SUBJECTIVE    Pain Level (on 0 to 10 scale):  1  / 10   Medication Changes/New allergies or changes in medical history, any new surgeries or procedures? NO    If yes, update Summary List   Subjective Functional Status/Changes:  []  No changes reported       Functional improvements: \"I took breaks yesterday when doing yard work and it really helped. I didn't have pain, just workout soreness. \"  Functional impairments: Decreased strength affecting endurance and shoulder mechanics with ADL's         OBJECTIVE  Modalities Rationale:     decrease inflammation and decrease pain to improve patient's ability to perform ADL's w/o pain      min [] Estim, type/location:                                      []  att     []  unatt     []  w/US     []  w/ice    []  w/heat    min []  Mechanical Traction: type/lbs                   []  pro   []  sup   []  int   []  cont    []  before manual    []  after manual    min []  Ultrasound, settings/location:      min []  Iontophoresis w/ dexamethasone, location:                                               []  take home patch       []  in clinic   10 min [x]  Ice     []  Heat    location/position: Supine to left shoulder    min []  Vasopneumatic Device, press/temp:  If using vaso (only need to measure limb vaso being performed on)      pre-treatment girth :       post-treatment girth :       measured at (landmark location) :       min []  Other:    [x] Skin assessment post-treatment (if applicable):    [x]  intact    [x]  redness- no adverse reaction     []redness - adverse reaction:      15 min Therapeutic Exercise:  [x]  See flow sheet   Rationale:      increase ROM and increase strength to improve the patients ability to reach overhead with ADL's using proper shoulder mechanics and no compensatory movements     15 min Therapeutic Activity: [x]  See flow sheet   Rationale:      increase ROM, increase strength and improve coordination to improve the patients ability to perform ADL's and yard work without compensatory movements     15 min Neuromuscular Re-ed: [x]  See flow sheet   Rationale:      increase strength and improve coordination to improve the patients ability to perform ADL's w/o pain     10 min Manual Therapy: Technique:      [x] S/DTM []IASTM []PROM [x] Passive Stretching   [x]manual TPR    [x]Jt manipulation:Gr I [] II []  III [x] IV[] V[]  Treatment Area:  STM to UT/supraspinatus, contract relax for IR, post/inf jt capsule   Rationale:      decrease pain, increase ROM and increase tissue extensibility to improve patient's ability to perform overhead activities . The manual therapy interventions were performed at a separate and distinct time from the therapeutic activities interventions. Billed With/As:   [x] TE   [x] TA   [x] Neuro   [] Self Care Patient Education: [x] Review HEP    [] Progressed/Changed HEP based on:   [] positioning   [] body mechanics   [] transfers   [] heat/ice application    [] other:        Other Objective/Functional Measures:    Progressed resistance and reps per flow sheet. Post Treatment Pain Level (on 0 to 10) scale:   0  / 10     ASSESSMENT    Assessment/Changes in Function:     Improvement noted in soft tissue tightness and decreased verbal cues for UT recruitment with shoulder activities.   Increased endurance noted with all activities     []  See Progress Note/Recertification   Patient will continue to benefit from skilled PT services to modify and progress therapeutic interventions, address functional mobility deficits, address ROM deficits, address strength deficits, analyze and address soft tissue restrictions, analyze and cue movement patterns, analyze and modify body mechanics/ergonomics and assess and modify postural abnormalities to attain remaining goals.       Progress toward goals / Updated goals:    Progressing towards recently updated goals     PLAN    [x]  Upgrade activities as tolerated YES Continue plan of care   []  Discharge due to :    []  Other:      Therapist: Niranjan Schaffer PT    Date: 3/23/2022 Time: 8:00 AM     Future Appointments   Date Time Provider Trev Ragsdale   3/23/2022  8:30 AM Pau Mcfadden, PT [facility]   3/28/2022  8:30 AM Pau Mcfadden PT [facility]   3/30/2022  8:30 AM Pau Mcfadden PT [facility]   4/4/2022  8:30 AM Pau Mcfadden PT [facility]   4/6/2022  8:30 AM Pau Mcfadden PT [facility]   8/10/2022  8:40 AM Mona Gardner NP [address] [facility]

## 2022-03-28 ENCOUNTER — HOSPITAL ENCOUNTER (OUTPATIENT)
Dept: PHYSICAL THERAPY | Age: 67
Discharge: HOME | End: 2022-03-28
Payer: MEDICARE

## 2022-03-28 PROCEDURE — 97110 THERAPEUTIC EXERCISES: CPT

## 2022-03-28 PROCEDURE — 97530 THERAPEUTIC ACTIVITIES: CPT

## 2022-03-28 PROCEDURE — 97112 NEUROMUSCULAR REEDUCATION: CPT

## 2022-03-28 PROCEDURE — 97140 MANUAL THERAPY 1/> REGIONS: CPT

## 2022-03-28 NOTE — PROGRESS NOTES
PHYSICAL THERAPY - DAILY TREATMENT NOTE     Patient Name: Jg Haynes        Date: 3/28/2022  : 1955   YES Patient  Verified  Visit #:     Insurance: Payor: Sandra Aguayo / Plan: [place] MEDICARE PART A & B / Product Type: Medicare /      In time: 292 Out time: 177   Total Treatment Time: 65     Medicare/BCBS Time Tracking (below)   Total Timed Codes (min):  65 1:1 Treatment Time:  55     TREATMENT AREA =  Pain in left shoulder [M25.512]    SUBJECTIVE    Pain Level (on 0 to 10 scale):  0  / 10   Medication Changes/New allergies or changes in medical history, any new surgeries or procedures? NO    If yes, update Summary List   Subjective Functional Status/Changes:  []  No changes reported       Functional improvements: \"I have noticed complete inactivity makes the shoulder ache. I'm getting better at remembering to take breaks which is helping\"  Functional impairments: decreased strength affecting ADL's and endurance.          OBJECTIVE  Modalities Rationale:     decrease inflammation and decrease pain to improve patient's ability to perform ADL's w/o pain      min [] Estim, type/location:                                      []  att     []  unatt     []  w/US     []  w/ice    []  w/heat    min []  Mechanical Traction: type/lbs                   []  pro   []  sup   []  int   []  cont    []  before manual    []  after manual    min []  Ultrasound, settings/location:      min []  Iontophoresis w/ dexamethasone, location:                                               []  take home patch       []  in clinic   10 min [x]  Ice     []  Heat    location/position: Supine to left shoulder following treatment    min []  Vasopneumatic Device, press/temp:  If using vaso (only need to measure limb vaso being performed on)      pre-treatment girth :       post-treatment girth :       measured at (landmark location) :       min []  Other:    [x] Skin assessment post-treatment (if applicable):    [x]  intact [x]  redness- no adverse reaction     []redness - adverse reaction:       15 min Therapeutic Exercise:  [x]? See flow sheet   Rationale:      increase ROM and increase strength to improve the patients ability to reach overhead with ADL's using proper shoulder mechanics and no compensatory movements      15 min Therapeutic Activity: [x]? See flow sheet   Rationale:      increase ROM, increase strength and improve coordination to improve the patients ability to perform ADL's and yard work without compensatory movements      15 min Neuromuscular Re-ed: [x]? See flow sheet   Rationale:      increase strength and improve coordination to improve the patients ability to perform ADL's w/o pain     10 min Manual Therapy: Technique:      [x] S/DTM []IASTM []PROM [x] Passive Stretching   []manual TPR    [x]Jt manipulation:Gr I [] II [x]  III [x] IV[] V[]  Treatment Area:  STM to UT/supraspinatus, contract relax for IR, post/inf jt capsule   Rationale:      decrease pain, increase ROM and increase tissue extensibility to improve patient's ability to perform ADL's w/o pain. The manual therapy interventions were performed at a separate and distinct time from the therapeutic activities interventions. Billed With/As:   [x] TE   [x] TA   [x] Neuro   [] Self Care Patient Education: [x] Review HEP    [] Progressed/Changed HEP based on:   [] positioning   [] body mechanics   [] transfers   [] heat/ice application    [] other:        Other Objective/Functional Measures:    Progressed per flow sheet     Post Treatment Pain Level (on 0 to 10) scale:   0  / 10     ASSESSMENT    Assessment/Changes in Function:     Patient continues to make excellent gains in strength and shoulder mechanics, with reduced UT recruitment and increased endurance.      []  See Progress Note/Recertification   Patient will continue to benefit from skilled PT services to modify and progress therapeutic interventions, address functional mobility deficits, address ROM deficits, address strength deficits, analyze and address soft tissue restrictions, analyze and cue movement patterns, analyze and modify body mechanics/ergonomics and assess and modify postural abnormalities to attain remaining goals. Progress toward goals / Updated goals:    1) Patient to be independent & compliant with progressive HEP in preparation for D/C. Good Progress  2) Patient to demonstrate 4+/5 or greater in left shoulder/scapula to allow for return to [facility] w/ pain. Good Progress noted by increased resistance and endurance  3.) Patient will improve FOTO score to>/= to 69 indicating improved functional mobility and [place] with ADL's. Good Progress as noted by verbal report of increased activity tolerance     PLAN    [x]  Upgrade activities as tolerated YES Continue plan of care   []  Discharge due to :    []  Other:      Therapist: Gillian Daniels PT    Date: 3/28/2022 Time: 8:00 AM     Future Appointments   Date Time Provider Trev Ragsdale   3/28/2022  8:30 AM Claudean Bennetts, PT [facility]   3/30/2022  8:30 AM Claudean Bennetts, PT [facility]   4/4/2022  8:30 AM Claudean Bennetts, PT [facility]   4/6/2022  8:30 AM Claudean Bennetts, PT [facility]   8/10/2022  8:40 AM Elham Babb, [facility]

## 2022-03-30 ENCOUNTER — HOSPITAL ENCOUNTER (OUTPATIENT)
Dept: PHYSICAL THERAPY | Age: 67
Discharge: HOME | End: 2022-03-30
Payer: MEDICARE

## 2022-03-30 PROCEDURE — 97110 THERAPEUTIC EXERCISES: CPT

## 2022-03-30 PROCEDURE — 97530 THERAPEUTIC ACTIVITIES: CPT

## 2022-03-30 PROCEDURE — 97112 NEUROMUSCULAR REEDUCATION: CPT

## 2022-03-30 PROCEDURE — 97140 MANUAL THERAPY 1/> REGIONS: CPT

## 2022-03-30 NOTE — PROGRESS NOTES
PHYSICAL THERAPY - DAILY TREATMENT NOTE     Patient Name: Volodymyr Londono        Date: 3/30/2022  : 1955   YES Patient  Verified  Visit #:   10   of   12  Insurance: Payor: Shyann Arellano / Plan: [place] MEDICARE PART A & B / Product Type: Medicare /      In time: 825 Out time: 930   Total Treatment Time: 65     Medicare/BCBS Time Tracking (below)   Total Timed Codes (min):  65 1:1 Treatment Time:  55     TREATMENT AREA =  Pain in left shoulder [M25.512]    SUBJECTIVE    Pain Level (on 0 to 10 scale):  0  / 10   Medication Changes/New allergies or changes in medical history, any new surgeries or procedures? NO    If yes, update Summary List   Subjective Functional Status/Changes:  []  No changes reported       Functional improvements: \"I'm doing good. I have been staining my deck and I just switch arms and it hasn't been too bad. \"  Functional impairments: Decreased strength and endurance affecting ADL's         OBJECTIVE  Modalities Rationale:     decrease inflammation and decrease pain to improve patient's ability to perform ADL's w/o pain      min [] Estim, type/location:                                      []  att     []  unatt     []  w/US     []  w/ice    []  w/heat    min []  Mechanical Traction: type/lbs                   []  pro   []  sup   []  int   []  cont    []  before manual    []  after manual    min []  Ultrasound, settings/location:      min []  Iontophoresis w/ dexamethasone, location:                                               []  take home patch       []  in clinic   10 min [x]  Ice     []  Heat    location/position: Supine to left shoulder    min []  Vasopneumatic Device, press/temp:  If using vaso (only need to measure limb vaso being performed on)      pre-treatment girth :       post-treatment girth :       measured at (landmark location) :       min []  Other:    [x] Skin assessment post-treatment (if applicable):    [x]  intact    [x]  redness- no adverse reaction     []redness - adverse reaction:      20 min Therapeutic Exercise:  [x]  See flow sheet   Rationale:      increase ROM and increase strength to improve the patients ability to perform overhead activities and yard work without pain/fatigue     15 min Therapeutic Activity: [x]  See flow sheet   Rationale:      increase strength to improve the patients ability to perform ADL's w/o pain     10 min Neuromuscular Re-ed: [x]  See flow sheet   Rationale:      increase ROM, increase strength, improve coordination and increase proprioception to improve the patients ability to perform overhead activities with good scapular control     10 min Manual Therapy: Technique:      [x] S/DTM []IASTM []PROM [x] Passive Stretching   [x]manual TPR    []Jt manipulation:Gr I [] II []  III [x] IV[] V[]  Treatment Area:  TrPR and STM to UT and supraspinatus, inf/post mobs followed by PROM and contract relax for IR   Rationale:      decrease pain, increase ROM and increase tissue extensibility to improve patient's ability to perform ADL's w/o pain and full ROM. The manual therapy interventions were performed at a separate and distinct time from the therapeutic activities interventions. Billed With/As:   [x] TE   [x] TA   [x] Neuro   [] Self Care Patient Education: [x] Review HEP    [] Progressed/Changed HEP based on:   [] positioning   [] body mechanics   [] transfers   [] heat/ice application    [] other:        Other Objective/Functional Measures: Added wall clock w/ YTB, changed PNF to 2# weight     Post Treatment Pain Level (on 0 to 10) scale:   0  / 10     ASSESSMENT    Assessment/Changes in Function:     Patient continues to make excellent progress in strength and AROM. Notable fatigue today especially with PNF and open chain exercises with loss of scapular control by end of session.      []  See Progress Note/Recertification   Patient will continue to benefit from skilled PT services to modify and progress therapeutic interventions, address functional mobility deficits, address ROM deficits, address strength deficits, analyze and address soft tissue restrictions, analyze and cue movement patterns, analyze and modify body mechanics/ergonomics and assess and modify postural abnormalities to attain remaining goals. Progress toward goals / Updated goals:      1) Patient to be independent & compliant with progressive HEP in preparation for D/C. Good Progress  2) Patient to demonstrate 4+/5 or greater in left shoulder/scapula to allow for return to [facility] w/ pain. Good Progress noted by increased resistance and endurance  3.) Patient will improve FOTO score to>/= to 69 indicating improved functional mobility and [place] with ADL's. Good Progress as noted by verbal report of increased activity tolerance     PLAN    [x]  Upgrade activities as tolerated YES Continue plan of care   []  Discharge due to :    []  Other:      Therapist: Cordelia Núñez PT    Date: 3/30/2022 Time: 7:57 AM     Future Appointments   Date Time Provider Trev Ragsdale   3/30/2022  8:30 AM Rica Runner, PT [facility]   4/4/2022  8:30 AM Rica Runner, PT [facility]   4/6/2022  8:30 AM Rica Runner, PT [facility]   8/10/2022  8:40 AM Fidel Babb NP [address]

## 2022-04-04 ENCOUNTER — HOSPITAL ENCOUNTER (OUTPATIENT)
Dept: PHYSICAL THERAPY | Age: 67
Discharge: HOME | End: 2022-04-04
Payer: MEDICARE

## 2022-04-04 PROCEDURE — 97530 THERAPEUTIC ACTIVITIES: CPT

## 2022-04-04 PROCEDURE — 97140 MANUAL THERAPY 1/> REGIONS: CPT

## 2022-04-04 PROCEDURE — 97110 THERAPEUTIC EXERCISES: CPT

## 2022-04-04 PROCEDURE — 97112 NEUROMUSCULAR REEDUCATION: CPT

## 2022-04-04 NOTE — PROGRESS NOTES
PHYSICAL THERAPY - DAILY TREATMENT NOTE     Patient Name: Dorian Jeffrey        Date: 2022  : 1955   YES Patient  Verified  Visit #:     Insurance: Payor: Jose A Peaks / Plan: [place] MED[place]ARE PART A & B / Product Type: Medicare /      In time: 879 Out time: 930   Total Treatment Time: 65     Medicare/BCBS Time Tracking (below)   Total Timed Codes (min):  65 1:1 Treatment Time:  55     TREATMENT AREA =  Pain in left shoulder [M25.512]    SUBJECTIVE    Pain Level (on 0 to 10 scale):  0  / 10   Medication Changes/New allergies or changes in medical history, any new surgeries or procedures? NO    If yes, update Summary List   Subjective Functional Status/Changes:  []  No changes reported       Functional improvements: \"I did yard work over the weekend but took lots of breaks.   No issues with my shoulder\" Patient saw MD last week and he feels no need for MRI or surgery  Functional impairments: Decreased strength and endurance affecting ADL's         OBJECTIVE  Modalities Rationale:     decrease inflammation and decrease pain to improve patient's ability to perform ADL's w/o pain      min [] Estim, type/location:                                      []  att     []  unatt     []  w/US     []  w/ice    []  w/heat    min []  Mechanical Traction: type/lbs                   []  pro   []  sup   []  int   []  cont    []  before manual    []  after manual    min []  Ultrasound, settings/location:      min []  Iontophoresis w/ dexamethasone, location:                                               []  take home patch       []  in clinic   10 min [x]  Ice     []  Heat    location/position: Supine to left shoulder following treatment    min []  Vasopneumatic Device, press/temp:  If using vaso (only need to measure limb vaso being performed on)      pre-treatment girth :       post-treatment girth :       measured at (landmark location) :       min []  Other:    [x] Skin assessment post-treatment (if applicable):    [x]  intact    [x]  redness- no adverse reaction     []redness - adverse reaction:      20 min Therapeutic Exercise:  [x]  See flow sheet   Rationale:      increase ROM and increase strength to improve the patients ability to perform all ADL's w/ ease     15 min Therapeutic Activity: [x]  See flow sheet   Rationale:      increase strength and improve coordination to improve the patients ability to perform dressing activities and yard work w/o difficulty or pain     10 min Neuromuscular Re-ed: [x]  See flow sheet   Rationale:      increase strength, improve coordination and increase proprioception to improve the patients ability to perform overhead activities w/ proper scapular control and full AROM w/o substitution     10 min Manual Therapy: Technique:      [x] S/DTM []IASTM []PROM [x] Passive Stretching   [x]manual TPR    [x]Jt manipulation:Gr I [] II []  III [x] IV[x] V[]  Treatment Area:  TrPR to UT/supraspinatus, inf/post capsule mobs followed by PROM all planes   Rationale:      decrease pain, increase ROM and increase tissue extensibility to improve patient's ability to perform all ADL's w/ full ROM. The manual therapy interventions were performed at a separate and distinct time from the therapeutic activities interventions. Billed With/As:   [x] TE   [x] TA   [x] Neuro   [] Self Care Patient Education: [x] Review HEP    [] Progressed/Changed HEP based on:   [] positioning   [] body mechanics   [] transfers   [] heat/ice application    [] other:        Other Objective/Functional Measures:    Progressed per flow sheet. Post Treatment Pain Level (on 0 to 10) scale:   0  / 10     ASSESSMENT    Assessment/Changes in Function:     Patient tolerated treatment session well w/ no increase in pain and Improved proximal control with activities.   Patient continues to fatigue with PNF and scaption/standing scapular exercises     []  See Progress Note/Recertification   Patient will continue to benefit from skilled PT services to modify and progress therapeutic interventions, address functional mobility deficits, address ROM deficits, address strength deficits, analyze and address soft tissue restrictions, analyze and cue movement patterns, analyze and modify body mechanics/ergonomics and assess and modify postural abnormalities to attain remaining goals.       Progress toward goals / Updated goals:    LT) Patient to demonstrate 4+/5 or greater in left shoulder/scapula to allow for return to PLF w/ pain Good Progress as noted by improved tolerance to therex/act       PLAN    [x]  Upgrade activities as tolerated YES Continue plan of care   []  Discharge due to :    []  Other:      Therapist: Aleyda Conner PT    Date: 2022 Time: 7:59 AM     Future Appointments   Date Time Provider Trev Ragsdale   2022  8:30 AM Shiv France, PT [facility]   2022  8:30 AM Shiv France PT [facility]   2022 12:00 PM Elyse Lewis, PT [facility]   2022  8:45 AM Elizabeth Dinning [facility]   2022  8:30 AM Shiv France PT [facility]   2022  8:30 AM Shiv France PT [facility]   8/10/2022  8:40 AM Antoni Rocha, NP [facility]

## 2022-04-06 ENCOUNTER — HOSPITAL ENCOUNTER (OUTPATIENT)
Dept: PHYSICAL THERAPY | Age: 67
Discharge: HOME | End: 2022-04-06
Payer: MEDICARE

## 2022-04-06 PROCEDURE — 97110 THERAPEUTIC EXERCISES: CPT

## 2022-04-06 PROCEDURE — 97112 NEUROMUSCULAR REEDUCATION: CPT

## 2022-04-06 PROCEDURE — 97530 THERAPEUTIC ACTIVITIES: CPT

## 2022-04-06 NOTE — PROGRESS NOTES
PHYSICAL THERAPY - DAILY TREATMENT NOTE     Patient Name: Jg Haynes        Date: 2022  : 1955   YES Patient  Verified  Visit #:     Insurance: Payor: Sandra Aguayo / Plan: [place] MEDICARE PART A & B / Product Type: Medicare /      In time: 002 Out time: 925   Total Treatment Time: 55     Medicare/BCBS Time Tracking (below)   Total Timed Codes (min):  55 1:1 Treatment Time:  45     TREATMENT AREA =  Pain in left shoulder [M25.512]    SUBJECTIVE    Pain Level (on 0 to 10 scale):  0  / 10   Medication Changes/New allergies or changes in medical history, any new surgeries or procedures? NO    If yes, update Summary List   Subjective Functional Status/Changes:  []  No changes reported       Functional improvements: \"I am feeling really tired and run down today. \"  Functional impairments: Decreased strength and endurance with ADL's         OBJECTIVE  Modalities Rationale:     decrease inflammation and decrease pain to improve patient's ability to perform ADL's w/o pain      min [] Estim, type/location:                                      []  att     []  unatt     []  w/US     []  w/ice    []  w/heat    min []  Mechanical Traction: type/lbs                   []  pro   []  sup   []  int   []  cont    []  before manual    []  after manual    min []  Ultrasound, settings/location:      min []  Iontophoresis w/ dexamethasone, location:                                               []  take home patch       []  in clinic   10 min [x]  Ice     []  Heat    location/position: Supine to left shoulder following treatment    min []  Vasopneumatic Device, press/temp:  If using vaso (only need to measure limb vaso being performed on)      pre-treatment girth :       post-treatment girth :       measured at (landmark location) :       min []  Other:    [x] Skin assessment post-treatment (if applicable):    [x]  intact    [x]  redness- no adverse reaction     []redness - adverse reaction:      15 min Therapeutic Exercise:  [x]  See flow sheet   Rationale:      increase ROM and increase strength to improve the patients ability to perform all ADL's w/o difficulty     15 min Therapeutic Activity: [x]  See flow sheet   Rationale:      increase ROM and increase strength to improve the patients ability to perform overhead activities w/o pain     15 min Neuromuscular Re-ed: [x]  See flow sheet   Rationale:      increase strength, improve coordination and increase proprioception to improve the patients ability to perform ADL's and yard work w/ proper scapular control and no pain     Billed With/As:   [x] TE   [x] TA   [x] Neuro   [] Self Care Patient Education: [x] Review HEP    [] Progressed/Changed HEP based on:   [] positioning   [] body mechanics   [] transfers   [] heat/ice application    [] other:        Other Objective/Functional Measures:    Progressed per flow sheet     Post Treatment Pain Level (on 0 to 10) scale:   0  / 10     ASSESSMENT    Assessment/Changes in Function:     Patient tolerated treatment session well, but notable fatigue today. Continues to fatigue w/ movements into ER and PNF pattern     []  See Progress Note/Recertification   Patient will continue to benefit from skilled PT services to modify and progress therapeutic interventions, address functional mobility deficits, address ROM deficits, address strength deficits, analyze and address soft tissue restrictions, analyze and cue movement patterns, analyze and modify body mechanics/ergonomics and assess and modify postural abnormalities to attain remaining goals. Progress toward goals / Updated goals:    LT) Patient to demonstrate 4+/5 or greater in left shoulder/scapula to allow for return to PLF w/ pain Good Progress as noted by improved tolerance to therex/act.  Continues to lack endurance in functional planes     PLAN    [x]  Upgrade activities as tolerated YES Continue plan of care   []  Discharge due to :    []  Other: Therapist: Shelby Parra, PT    Date: 2022 Time: 7:58 AM     Future Appointments   Date Time Provider Trev Ragsdale   2022  8:30 AM Leandro , PT [facility]   2022 12:00 PM Arnie Williamson, PT [facility]   2022  8:45 AM Johnathan Bean [facility]   2022  8:30 AM Leandro Mena, PT [facility]   2022  8:30 AM Leandro Mena, PT [facility]   8/10/2022  8:40 AM Leroy Babb, NP [address] [facility]

## 2022-04-11 ENCOUNTER — APPOINTMENT (OUTPATIENT)
Dept: PHYSICAL THERAPY | Age: 67
End: 2022-04-11
Payer: MEDICARE

## 2022-04-13 ENCOUNTER — HOSPITAL ENCOUNTER (OUTPATIENT)
Dept: PHYSICAL THERAPY | Age: 67
Discharge: HOME | End: 2022-04-13
Payer: MEDICARE

## 2022-04-13 PROCEDURE — 97112 NEUROMUSCULAR REEDUCATION: CPT

## 2022-04-13 PROCEDURE — 97530 THERAPEUTIC ACTIVITIES: CPT

## 2022-04-13 PROCEDURE — 97110 THERAPEUTIC EXERCISES: CPT

## 2022-04-13 NOTE — PROGRESS NOTES
PHYSICAL THERAPY - DAILY TREATMENT NOTE    Patient Name: Sara Chilel        Date: 2022  : 1955   YES Patient  Verified  Visit #:   15      20  Insurance: Payor: Indu Paula / Plan: [place] MEDICARE PART A & B / Product Type: Medicare /      In time: 101 Out time: 945   Total Treatment Time: 60     BCBS/Medicare Time Tracking (below)   Total Timed Codes (min):  45 1:1 Treatment Time:  45     TREATMENT AREA =  Pain in left shoulder [M25.512]    SUBJECTIVE  Pain Level (on 0 to 10 scale):  2  / 10   Medication Changes/New allergies or changes in medical history, any new surgeries or procedures? NO    If yes, update Summary List   Subjective Functional Status/Changes:  []  No changes reported     Patient reports that her arm is sore from loading and mulching all weekend.            Modalities Rationale:     decrease inflammation and decrease pain to improve patient's ability to perform pain free ADLs   min [] Estim, type/location:                                      []  att     []  unatt     []  w/US     []  w/ice    []  w/heat    min []  Mechanical Traction: type/lbs                   []  pro   []  sup   []  int   []  cont    []  before manual    []  after manual    min []  Ultrasound, settings/location:      min []  Iontophoresis w/ dexamethasone, location:                                               []  take home patch       []  in clinic   10 min [x]  Ice     [x]  Heat    location/position: CP to L shoulder in supine     min []  Vasopneumatic Device, press/temp:    If using vaso (only need to measure limb vaso being performed on)      pre-treatment girth :       post-treatment girth :       measured at (landmark location) :      min []  Other:    [] Skin assessment post-treatment (if applicable):    []  intact    []  redness- no adverse reaction                  []redness - adverse reaction:        15 min Therapeutic Exercise:  [x]  See flow sheet   Rationale:      increase ROM, increase strength, improve coordination, improve balance and increase proprioception to improve the patients ability to perform unlimited ADls       15 min Therapeutic Activity: [x]  See flow sheet   Rationale:    increase ROM, increase strength, improve coordination, improve balance and increase proprioception to improve the patients ability to preform unlimited ADLs     15 min Neuromuscular Re-ed: [x]  See flow sheet   Rationale:    increase ROM, increase strength, improve coordination, improve balance and increase proprioception to improve the patients ability to improve propriceptive control of the L scapula with lifting. Billed With/As:   [] TE   [] TA   [] Neuro   [] Self Care Patient Education: [x] Review HEP    [] Progressed/Changed HEP based on:   [] positioning   [] body mechanics   [] transfers   [] heat/ice application    [] other:      Other Objective/Functional Measures:    Continued exercises per flow sheet, difficulty with scapular control during wall clocks     Post Treatment Pain Level (on 0 to 10) scale:   0  / 10     ASSESSMENT  Assessment/Changes in Function:     Patient is making good progress with PT rx and is making good self corrections to prevent UT compensation with elevation of the L arm.,      []  See Progress Note/Recertification   Patient will continue to benefit from skilled PT services to modify and progress therapeutic interventions, address functional mobility deficits, address ROM deficits, address strength deficits, analyze and address soft tissue restrictions, analyze and cue movement patterns, analyze and modify body mechanics/ergonomics, assess and modify postural abnormalities, address imbalance/dizziness and instruct in home and community integration to attain remaining goals. Progress toward goals / Updated goals:    Progressing towards LTG 2.       PLAN  [x]  Upgrade activities as tolerated YES Continue plan of care   []  Discharge due to :    []  Other:      Therapist: Stacey Lobo    Date: 4/13/2022 Time: 10:21 AM     Future Appointments   Date Time Provider Trev Ragsdale   4/18/2022  8:30 AM Raeann Haddad, PT [facility]   4/20/2022  8:30 AM Raeann Haddad, PT [facility]   4/25/2022 10:45 AM Raeann Haddad, PT [facility]   8/10/2022  8:40 AM Albaro Babb, NP [address] [facility]

## 2022-04-18 ENCOUNTER — HOSPITAL ENCOUNTER (OUTPATIENT)
Dept: PHYSICAL THERAPY | Age: 67
Discharge: HOME | End: 2022-04-18
Payer: MEDICARE

## 2022-04-18 PROCEDURE — 97112 NEUROMUSCULAR REEDUCATION: CPT

## 2022-04-18 PROCEDURE — 97110 THERAPEUTIC EXERCISES: CPT

## 2022-04-18 PROCEDURE — 97140 MANUAL THERAPY 1/> REGIONS: CPT

## 2022-04-18 NOTE — PROGRESS NOTES
PHYSICAL THERAPY - DAILY TREATMENT NOTE     Patient Name: Janine James        Date: 2022  : 1955   YES Patient  Verified  Visit #:     Insurance: Payor: Erin Myles / Plan: [place] MEDICARE PART A & B / Product Type: Medicare /      In time: 989 Out time: 930   Total Treatment Time: 60     Medicare/BCBS Time Tracking (below)   Total Timed Codes (min):  60 1:1 Treatment Time:  50     TREATMENT AREA =  Pain in left shoulder [M25.512]    SUBJECTIVE    Pain Level (on 0 to 10 scale):  1  / 10   Medication Changes/New allergies or changes in medical history, any new surgeries or procedures? NO    If yes, update Summary List   Subjective Functional Status/Changes:  []  No changes reported       Functional improvements: \"Overall I feel pretty good but I am having a lot of aching and it was pinching when I was drying my hair.   I've done a lot of yard work\"  Functional impairments: Decreased functional mobility and endurance with ADL's         OBJECTIVE  Modalities Rationale:     decrease inflammation and decrease pain to improve patient's ability to perform ADL's w/o pain      min [] Estim, type/location:                                      []  att     []  unatt     []  w/US     []  w/ice    []  w/heat    min []  Mechanical Traction: type/lbs                   []  pro   []  sup   []  int   []  cont    []  before manual    []  after manual    min []  Ultrasound, settings/location:      min []  Iontophoresis w/ dexamethasone, location:                                               []  take home patch       []  in clinic   10 min [x]  Ice     []  Heat    location/position: Supine to left shoulder following treatment    min []  Vasopneumatic Device, press/temp:  If using vaso (only need to measure limb vaso being performed on)      pre-treatment girth :       post-treatment girth :       measured at (landmark location) :       min []  Other:    [x] Skin assessment post-treatment (if applicable): [x]  intact    [x]  redness- no adverse reaction     []redness - adverse reaction:      25 min Therapeutic Exercise:  [x]  See flow sheet   Rationale:      increase ROM and increase strength to improve the patients ability to perform all ADL's w/o difficulty or pain     15 min Neuromuscular Re-ed: [x]  See flow sheet   Rationale:      increase ROM, increase strength, improve coordination and increase proprioception to improve the patients ability to perform overhead activities w/o pain     10 min Manual Therapy: Technique:      [x] S/DTM []IASTM []PROM [] Passive Stretching   [x]manual TPR    []Jt manipulation:Gr I [] II []  III [] IV[] V[]  Treatment Area:  TrPR to supraspinatus, STM, contract relax IR   Rationale:      decrease pain, increase ROM and increase tissue extensibility to improve patient's ability to perform overhead activities w/ full ROM. The manual therapy interventions were performed at a separate and distinct time from the therapeutic activities interventions. Billed With/As:   [x] TE   [] TA   [x] Neuro   [] Self Care Patient Education: [x] Review HEP    [] Progressed/Changed HEP based on:   [] positioning   [] body mechanics   [] transfers   [] heat/ice application    [] other:        Other Objective/Functional Measures:    Progressed per flow sheet     Post Treatment Pain Level (on 0 to 10) scale:   0  / 10     ASSESSMENT    Assessment/Changes in Function:     Increased pain and soft tissue tightness today resulting in poor shoulder mechanics. Patient was able to achieve full pain free ROM following MT and therex. Continues to make good gains in overall strength, lacking endurance in scapular stabilizers and RTC. Improved scapular control with wall clocks today.      []  See Progress Note/Recertification   Patient will continue to benefit from skilled PT services to modify and progress therapeutic interventions, address functional mobility deficits, address ROM deficits, address strength deficits, analyze and address soft tissue restrictions, analyze and cue movement patterns, analyze and modify body mechanics/ergonomics and assess and modify postural abnormalities to attain remaining goals. Progress toward goals / Updated goals:    Good progress towards LTG #2.  Progressing well towards D/C     PLAN    [x]  Upgrade activities as tolerated YES Continue plan of care   []  Discharge due to :    []  Other:      Therapist: Cecilia Simmons PT    Date: 4/18/2022 Time: 7:58 AM     Future Appointments   Date Time Provider Trev Ragsdale   4/18/2022  8:30 AM [facility], PT [facility]   4/20/2022  8:30 AM James [facility], PT [facility]   4/25/2022 10:45 AM [place] [facility], PT [facility]   8/10/2022  8:40 AM Rhonda Steve NP [address]

## 2022-04-20 ENCOUNTER — HOSPITAL ENCOUNTER (OUTPATIENT)
Dept: PHYSICAL THERAPY | Age: 67
Discharge: HOME | End: 2022-04-20
Payer: MEDICARE

## 2022-04-20 PROCEDURE — 97530 THERAPEUTIC ACTIVITIES: CPT

## 2022-04-20 PROCEDURE — 97112 NEUROMUSCULAR REEDUCATION: CPT

## 2022-04-20 PROCEDURE — 97110 THERAPEUTIC EXERCISES: CPT

## 2022-04-20 NOTE — PROGRESS NOTES
PHYSICAL THERAPY - DAILY TREATMENT NOTE     Patient Name: Eileen Swartz        Date: 2022  : 1955   YES Patient  Verified  Visit #:   15   of   20  Insurance: Payor: Josefina Medici / Plan: [place] MEDICARE PART A & B / Product Type: Medicare /      In time: 191 Out time: 930   Total Treatment Time: 60     Medicare/BCBS Time Tracking (below)   Total Timed Codes (min):  60 1:1 Treatment Time:  50     TREATMENT AREA =  Pain in left shoulder [M25.512]    SUBJECTIVE    Pain Level (on 0 to 10 scale):  0-1  / 10   Medication Changes/New allergies or changes in medical history, any new surgeries or procedures? NO    If yes, update Summary List   Subjective Functional Status/Changes:  []  No changes reported       Functional improvements: \"I feel so much better than Monday.   I was able to do my hair without pain or difficulty\"  Functional impairments: Decreased strength and endurance affecting ADL's         OBJECTIVE  Modalities Rationale:     decrease inflammation and decrease pain to improve patient's ability to perform ADL's w/o pain      min [] Estim, type/location:                                      []  att     []  unatt     []  w/US     []  w/ice    []  w/heat    min []  Mechanical Traction: type/lbs                   []  pro   []  sup   []  int   []  cont    []  before manual    []  after manual    min []  Ultrasound, settings/location:      min []  Iontophoresis w/ dexamethasone, location:                                               []  take home patch       []  in clinic   10 min [x]  Ice     []  Heat    location/position: Supine to left shoulder following treatment session    min []  Vasopneumatic Device, press/temp:  If using vaso (only need to measure limb vaso being performed on)      pre-treatment girth :       post-treatment girth :       measured at (landmark location) :       min []  Other:    [x] Skin assessment post-treatment (if applicable):    [x]  intact    [x]  redness- no adverse reaction     []redness - adverse reaction:      20 min Therapeutic Exercise:  [x]  See flow sheet   Rationale:      increase ROM and increase strength to improve the patients ability to perform overhead reaching activities w/ proper mechanics and no compensatory movements     15 min Therapeutic Activity: [x]  See flow sheet   Rationale:      increase ROM and increase strength to improve the patients ability to perform lifting and carrying tasks w/o compensatory movements     15 min Neuromuscular Re-ed: [x]  See flow sheet   Rationale:      increase strength, improve coordination and increase proprioception to improve the patients ability to perform all ADL's w/ proper shoulder mechanics and good scapular control     Billed With/As:   [x] TE   [x] TA   [x] Neuro   [] Self Care Patient Education: [x] Review HEP    [] Progressed/Changed HEP based on:   [] positioning   [] body mechanics   [] transfers   [] heat/ice application    [] other:        Other Objective/Functional Measures:    Progressed per flow sheet  Reviewed HEP progression and symptom management/activity modification to prepare for D/C     Post Treatment Pain Level (on 0 to 10) scale:   0  / 10     ASSESSMENT    Assessment/Changes in Function:     Good tolerance to all exercises. Notable fatigue with standing scaption and wall clocks but she was able to maintain good scapular control and minimal UT recruitment. Progressing well towards discharge and independence. []  See Progress Note/Recertification   Patient will continue to benefit from skilled PT services to modify and progress therapeutic interventions, address functional mobility deficits, address ROM deficits, address strength deficits, analyze and address soft tissue restrictions, analyze and cue movement patterns, analyze and modify body mechanics/ergonomics and assess and modify postural abnormalities to attain remaining goals.       Progress toward goals / Updated goals:    Progressing towards all LTG's     PLAN    [x]  Upgrade activities as tolerated YES Continue plan of care   []  Discharge due to :    [x]  Other: D/C n/v     Therapist: Nathalie Esposito PT    Date: 4/20/2022 Time: 8:03 AM     Future Appointments   Date Time Provider Trev aRgsdale   4/20/2022  8:30 AM Yana Cabrera, PT [facility]   4/25/2022 10:45 AM Yana Cabrera, PT [facility]   8/10/2022  8:40 AM [place], [place] NP [address] [facility]

## 2022-04-25 ENCOUNTER — HOSPITAL ENCOUNTER (OUTPATIENT)
Dept: PHYSICAL THERAPY | Age: 67
Discharge: HOME | End: 2022-04-25
Payer: MEDICARE

## 2022-04-25 PROCEDURE — 97140 MANUAL THERAPY 1/> REGIONS: CPT

## 2022-04-25 PROCEDURE — 97112 NEUROMUSCULAR REEDUCATION: CPT

## 2022-04-25 PROCEDURE — 97110 THERAPEUTIC EXERCISES: CPT

## 2022-04-25 PROCEDURE — 97530 THERAPEUTIC ACTIVITIES: CPT

## 2022-04-25 NOTE — PROGRESS NOTES
PHYSICAL THERAPY - DAILY TREATMENT NOTE     Patient Name: Estanislado Pallas        Date: 2022  : 1955   YES Patient  Verified  Visit #:     Insurance: Payor: Jill Ill / Plan: [place] MEDICARE PART A & B / Product Type: Medicare /      In time: 3533 Out time: 1150   Total Treatment Time: 65     Medicare/BCBS Time Tracking (below)   Total Timed Codes (min):  65 1:1 Treatment Time:  55     TREATMENT AREA =  Pain in left shoulder [M25.512]    SUBJECTIVE    Pain Level (on 0 to 10 scale):  1  / 10   Medication Changes/New allergies or changes in medical history, any new surgeries or procedures?     NO    If yes, update Summary List   Subjective Functional Status/Changes:  []  No changes reported       Functional improvements: \"I am sore from weekend activities but remembering to take breaks\"           OBJECTIVE  Modalities Rationale:     decrease inflammation and decrease pain to improve patient's ability to perform all ADL's w/o pain      min [] Estim, type/location:                                      []  att     []  unatt     []  w/US     []  w/ice    []  w/heat    min []  Mechanical Traction: type/lbs                   []  pro   []  sup   []  int   []  cont    []  before manual    []  after manual    min []  Ultrasound, settings/location:      min []  Iontophoresis w/ dexamethasone, location:                                               []  take home patch       []  in clinic   10 min [x]  Ice     []  Heat    location/position: Supine to L shoulder following treatment session    min []  Vasopneumatic Device, press/temp:  If using vaso (only need to measure limb vaso being performed on)      pre-treatment girth :       post-treatment girth :       measured at (landmark location) :       min []  Other:    [x] Skin assessment post-treatment (if applicable):    [x]  intact    [x]  redness- no adverse reaction     []redness - adverse reaction:      15 min Therapeutic Exercise:  [x]  See flow sheet Rationale:      increase ROM and increase strength to improve the patients ability to reach overhead and lift w/o pain     15 min Therapeutic Activity: [x]  See flow sheet   Rationale:      increase ROM, increase strength and improve coordination to improve the patients ability to perform dressing and grooming activities w/o difficulty and proper shoulder mechanics     10 min Neuromuscular Re-ed: [x]  See flow sheet   Rationale:      increase ROM, increase strength, improve coordination and increase proprioception to improve the patients ability to reach overhead w/ good scapular control     15 min Manual Therapy: Technique:      [x] S/DTM []IASTM [x]PROM [] Passive Stretching   [x]manual TPR    []Jt manipulation:Gr I [] II []  III [] IV[] V[]  Treatment Area:  STM to supraspinatus, PROM all planes, contract relax for IR   Rationale:      decrease pain, increase ROM and increase tissue extensibility to improve patient's ability to perform all ADL's and overhead activities w/o pain. The manual therapy interventions were performed at a separate and distinct time from the therapeutic activities interventions.     Billed With/As:   [x] TE   [x] TA   [x] Neuro   [] Self Care Patient Education: [x] Review HEP    [] Progressed/Changed HEP based on:   [] positioning   [] body mechanics   [] transfers   [] heat/ice application    [] other:        Other Objective/Functional Measures:    Discussed appropriate activities, body mechanics, activities to avoid to prevent re injury     Post Treatment Pain Level (on 0 to 10) scale:   0  / 10     ASSESSMENT    Assessment/Changes in Function:     See D/C summary     []  See Progress Note/Recertification   Patient will continue to benefit from skilled PT services to modify and progress therapeutic interventions, address functional mobility deficits, address ROM deficits, address strength deficits, analyze and address soft tissue restrictions, analyze and cue movement patterns, analyze and modify body mechanics/ergonomics and assess and modify postural abnormalities to attain remaining goals. Progress toward goals / Updated goals:    See D/C Summary     PLAN    []  Upgrade activities as tolerated No Continue plan of care   [x]  Discharge due to :  All goals met   []  Other:      Therapist: Manisha Ha PT    Date: 4/25/2022 Time: 8:08 AM     Future Appointments   Date Time Provider Trev Ragsdale   4/25/2022 10:45 AM Raeann Haddad PT [facility]   8/10/2022  8:40 AM Rodney Chopra NP [facility] [facility]

## 2022-04-25 NOTE — PROGRESS NOTES
[facility] AT [address] [place] [address] [address], [address] ,[address], [address]  Phone: [phone]  Fax: [phone] SUMMARY FOR PHYSICAL THERAPY          Patient Name: Jaci León : 1955   Treatment/Medical Diagnosis: Pain in left shoulder [M25.512]   Onset Date: 2022    Referral Source: Maria De Jesus Go MD [facility]): 22   Prior Hospitalization: See Medical History Provider #: [id number]   Prior Level of Function: Independent all ADL's   Comorbidities: Depression, Arthritis, HTN   Medications: Verified on Patient Summary List   Visits from [facility]: 16 Missed Visits: 0     Previous Goals:  1) Patient to be independent & compliant with progressive HEP in preparation for D/C.  2) Patient to demonstrate 4+/5 or greater in left shoulder/scapula to allow for return to PLF w/ pain  3.) Patient will improve FOTO score to>/= to 69 indicating improved functional mobility and Barnes with ADL's. Prior Level/Current Level:  1) Prior Level: Occasional vc's for technique   Current Level: Independent with current HEP and it's progression at home   Goal Met? yes  2) Prior Level: 4-4+/5   Current Level: 4+-5/5   Goal Met? yes  3) Prior Level: 56/100   Current Level: 68   Goal Met? yes    Key Functional Changes/Progress: Ms. Sara Betancourt has made excellent progress in her physical therapy, consistently reporting improved functional use without increased pain and increased independence w/ activity modification and symptom management. She currently has full pain free AROM and 4+-5/5 strength throughout her left UE and is independent with all activities and symptom management    Assessments/Recommendations: Discontinue therapy. Progressing towards or have reached established goals. If you have any questions/comments please contact us directly at [phone]. Thank you for allowing us to assist in the care of your patient.     Therapist Signature: Cordelia Núñez, PT Date: 4/25/22   Reporting Period: 3/21/22 to 4/25/22 Time: 8:13 AM

## 2022-06-29 ENCOUNTER — HOSPITAL ENCOUNTER (OUTPATIENT)
Dept: PHYSICAL THERAPY | Age: 67
Discharge: HOME | End: 2022-06-29
Payer: MEDICARE

## 2022-06-29 PROCEDURE — 97530 THERAPEUTIC ACTIVITIES: CPT

## 2022-06-29 PROCEDURE — 97161 PT EVAL LOW COMPLEX 20 MIN: CPT

## 2022-06-29 NOTE — PROGRESS NOTES
PHYSICAL THERAPY - DAILY TREATMENT NOTE    Patient Name: Shannon Link        Date: 2022  : 1955   YES Patient  Verified  Visit #:     Insurance: Payor: Mariel Coronel / Plan: VA MEDICARE PART A & B / Product Type: Medicare /      In time: 222 Out time: 950   Total Treatment Time: 65     BCBS/Medicare Time Tracking (below)   Total Timed Codes (min):  55 1:1 Treatment Time:  55     TREATMENT AREA =  Pain in left shoulder [M25.512]    SUBJECTIVE  Pain Level (on 0 to 10 scale):    / 10   Medication Changes/New allergies or changes in medical history, any new surgeries or procedures? NO    If yes, update Summary List   Subjective Functional Status/Changes:  []  No changes reported     Patient reports that she has not taken any pain medication since yesterday afternoon due to having chills, shaking, sweating. She then took up to 4000 mg of tylenol per instructions of nurse and reports having a rash. She tried to shower this morning and felt like she as going to pass out and was nauseous.            Modalities Rationale:     decrease inflammation and decrease pain to improve patient's ability to improve ease with ADLs   min [] Estim, type/location:                                      []  att     []  unatt     []  w/US     []  w/ice    []  w/heat    min []  Mechanical Traction: type/lbs                   []  pro   []  sup   []  int   []  cont    []  before manual    []  after manual    min []  Ultrasound, settings/location:      min []  Iontophoresis w/ dexamethasone, location:                                               []  take home patch       []  in clinic   10 min [x]  Ice     []  Heat    location/position: CP to L shoulder in sitting     min []  Vasopneumatic Device, press/temp:    If using vaso (only need to measure limb vaso being performed on)      pre-treatment girth :       post-treatment girth :       measured at (landmark location) :      min []  Other:    [] Skin assessment post-treatment (if applicable):    []  intact    []  redness- no adverse reaction                  []redness - adverse reaction:        10 min Therapeutic Activity: [x]  See flow sheet; reviewed positioning for sleep, use of pendulums for hygiene, reviewed all surgical precautions   Rationale:    increase ROM, increase strength, improve coordination, improve balance and increase proprioception to improve the patients ability to improve ease with sleeping and dressing    Billed With/As:   [] TE   [] TA   [] Neuro   [] Self Care Patient Education: [x] Review HEP    [] Progressed/Changed HEP based on:   [] positioning   [] body mechanics   [] transfers   [] heat/ice application    [] other:      Other Objective/Functional Measures:    See POC      Post Treatment Pain Level (on 0 to 10) scale:   4  / 10     ASSESSMENT  Assessment/Changes in Function:     Fair tolerance to todays RX. Able to perform pendulums without pain but did have difficulty relaxing throughout manual therapy. []  See Progress Note/Recertification   Patient will continue to benefit from skilled PT services to modify and progress therapeutic interventions, address functional mobility deficits, address ROM deficits, address strength deficits, analyze and address soft tissue restrictions, analyze and cue movement patterns, analyze and modify body mechanics/ergonomics, assess and modify postural abnormalities, address imbalance/dizziness and instruct in home and community integration to attain remaining goals. Progress toward goals / Updated goals:    Progressing towards LTG 2.      PLAN  [x]  Upgrade activities as tolerated YES Continue plan of care   []  Discharge due to :    []  Other:      Therapist: Kelton Dosdon    Date: 6/29/2022 Time: 12:02 PM     Future Appointments   Date Time Provider Trev Ragsdale   6/30/2022  7:30 AM Mitra Massey, PT [facility]   7/1/2022  2:45 PM Veronica Root, PT [facility]   7/5/2022  8:30 AM Doug Beltran Esa Yin, PT [facility]   7/6/2022  8:00 AM Cheyenne Eng [facility]   7/7/2022 11:45 AM Reno Bustos, PT [facility]   7/8/2022  8:30 AM Louise Reed, PT [facility]   7/11/2022  8:00 AM Yasmin Eng [facility]   7/13/2022  7:15 AM Reno Bustos, PT [facility] [place] [facility]   7/15/2022 11:45 AM Reno Bustos, PT [facility]   7/18/2022  8:45 AM Nicolasa Ramon, PT [facility]   7/20/2022  8:00 AM Cheyenne Eng [facility]   7/22/2022  8:00 AM Nicolasa Ramon, PT [facility]   7/25/2022  8:00 AM Nicolasa Ramon, PT [facility]   7/27/2022  8:00 AM Cheyenne Eng [facility]   7/29/2022  8:00 AM Nicolasa Ramon, PT [facility]   8/10/2022  8:40 AM Nanette Babb, FARTUN White

## 2022-06-29 NOTE — PROGRESS NOTES
[address] [place] [address] PHYSICAL THERAPY AT [address] [place] [address]  Nick Magallanes [address], [address] [place] [address], [address]  Phone: [phone]  Fax: [phone] / [address] [facility]  Patient Name: Myrtle Eden : 1955   Medical   Diagnosis: L RCR, biceps tenodesis, capsular release + manipulation, SAD Treatment Diagnosis: Pain in left shoulder [M25.512]   Onset Date: 2022     Referral Source: Yamila Silva MD [facility]): 2022   Prior Hospitalization: See medical history Provider #: [id number]   Prior Level of Function: Painful overhead reaching, able to lift 40 pound bags of mulch and garden, L hand dominant   Comorbidities: Arthritis, depression, HTN   Medications: Verified on Patient Summary List   The Plan of Care and following information is based on the information from the initial evaluation.   ===========================================================================================  Assessment / key information:  Patient is a pleasant 77year old female presenting to [facility] with a dx of L RCR, SAD, biceps tenodesis, capsular release + manipulation on 2022. She reports that pain overall has been pretty bad but she had a reaction to the narcotic prescribed her, and then broke out in a rash when taking more tylenol. Today she presents to PT without pain medication or tylenol due to reactions and reports pain is a 4/10. She reports that this morning was the first morning that she came out of the sling to shower and had a lot of pain, nausea and felt like she was going to pass out. Today surgical dressing was removed, steri strips were still intact and no signs of drainage or infection was present. Surgical precautions were reviewed with patient and importance of avoiding elbow flexion + flexion as well as active shoulder ROM to protect surgical site.   Sling was adjusted for appropriate fit and patient was educated on use of pendulums for dressing and hygiene. PROM today was flexion 0-84 degrees, ABD to 45 degrees and ER to neutral.  Patient had difficulty relaxing, but will be adjusting medication as prescribed by MD/PA today. Patient would benefit from skilled PT services in order to increase strength, range of motion, balance, proprioception, coordination in order to improve ease with ADLs and functional mobility.      ===========================================================================================  Eval Complexity: History MEDIUM  Complexity : 1-2 comorbidities / personal factors will impact the outcome/ POC ;  Examination  LOW Complexity : 1-2 Standardized tests and measures addressing body structure, function, activity limitation and / or participation in recreation ; Presentation LOW Complexity : Stable, uncomplicated ;  Decision Making MEDIUM Complexity : FOTO score of 26-74; Overall Complexity LOW   Problem List: pain affecting function, decrease ROM, decrease strength, edema affecting function, impaired gait/ balance, decrease ADL/ functional abilitiies, decrease activity tolerance, decrease flexibility/ joint mobility and decrease transfer abilities   Treatment Plan may include any combination of the following: Therapeutic exercise, Therapeutic activities, Neuromuscular re-education, Physical agent/modality, Gait/balance training, Manual therapy, Aquatic therapy, Patient education, Self Care training, Functional mobility training, Home safety training and Stair training  Patient / Family readiness to learn indicated by: asking questions, trying to perform skills and interest  Persons(s) to be included in education: patient (P)  Barriers to Learning/Limitations: None  Measures taken, if barriers to learning:    Patient Goal (s): \"Return to normal ADLS\"   Patient self reported health status: good  Rehabilitation Potential: good   Short Term Goals:  To be accomplished in  6  weeks:  1) Patient to be I and compliant with a basic home exercise program in order to allow for ease with comfort within sling. 2) Patient will improve PROM to flexion 0-160 degrees, ER to 60 degrees in order to allow for ease with eventual OH lifting. 3) Patient will decrease pain to less than 2/10 at most in order to allow for ease with ADLs.  Long Term Goals: To be accomplished in  12  weeks:  1) Patient will be I and compliant with a progressive AAROM --> AROM program starting at 6 weeks to allow for ease with ADLs. 2) Patient will restore AROM to flexion 0-170 degrees, FXL ER to posterior occiput, FXL IR to L4 to allow for ease with dressing. 3) Patient will increase strength of the L shoulder to 3/5 in order to allow for ease with light lifting. 4) Patient will increase FOTO score to >/= 58 in order to allow for ease with reaching shoulder heigh shelves. Frequency / Duration:   Patient to be seen  2-5  times per week for 12  weeks:  Patient / Caregiver education and instruction: self care, activity modification and exercises  Therapist Signature: Dorothy Howard PT DPT  Date: 7/41/9697   Certification Period: 6/29/2022- 9/28/2022 Time: 10:25 AM   ===========================================================================================  I certify that the above Physical Therapy Services are being furnished while the patient is under my care. I agree with the treatment plan and certify that this therapy is necessary. Physician Signature:                                                             Date:                                     Time:                                                                       Brandyn Cook MD  Please sign and return to [facility] or you may fax the signed copy to [phone]. Thank you.

## 2022-06-30 ENCOUNTER — HOSPITAL ENCOUNTER (OUTPATIENT)
Dept: PHYSICAL THERAPY | Age: 67
Discharge: HOME | End: 2022-06-30
Payer: MEDICARE

## 2022-06-30 ENCOUNTER — APPOINTMENT (OUTPATIENT)
Dept: PHYSICAL THERAPY | Age: 67
End: 2022-06-30
Payer: MEDICARE

## 2022-06-30 PROCEDURE — 97140 MANUAL THERAPY 1/> REGIONS: CPT

## 2022-06-30 PROCEDURE — 97530 THERAPEUTIC ACTIVITIES: CPT

## 2022-06-30 PROCEDURE — 97110 THERAPEUTIC EXERCISES: CPT

## 2022-06-30 NOTE — PROGRESS NOTES
PHYSICAL THERAPY - DAILY TREATMENT NOTE    Patient Name: Jenna Eid        Date: 2022  : 1955   YES Patient  Verified  Visit #:      of   12  Insurance: Payor: Roger Arellano / Plan: VA MEDICARE PART A & B / Product Type: Medicare /      In time: 280 Out time: 825   Total Treatment Time: 50     BCBS/Medicare Time Tracking (below)   Total Timed Codes (min):  40 1:1 Treatment Time:  40     TREATMENT AREA =  Pain in left shoulder [M25.512]    SUBJECTIVE  Pain Level (on 0 to 10 scale):  5  / 10   Medication Changes/New allergies or changes in medical history, any new surgeries or procedures?     NO    If yes, update Summary List   Subjective Functional Status/Changes:  []  No changes reported     Was able to sleep in bed last night but propped up on a lot of pillows          Modalities Rationale:     decrease inflammation, decrease pain and increase tissue extensibility to improve patient's ability to perform ADLs   min [] Estim, type/location:                                     []  att     []  unatt     []  w/US     []  w/ice    []  w/heat    min []  Mechanical Traction: type/lbs                   []  pro   []  sup   []  int   []  cont    []  before manual    []  after manual    min []  Ultrasound, settings/location:      min []  Iontophoresis w/ dexamethasone, location:                                               []  take home patch       []  in clinic   10 min [x]  Ice     []  Heat    location/position: L shoulder in supine    min []  Vasopneumatic Device, press/temp: If using vaso (only need to measure limb vaso being performed on)      pre-treatment girth :       post-treatment girth :       measured at (landmark location) :      min []  Other:    [x] Skin assessment post-treatment (if applicable):    [x]  intact    [x]  redness- no adverse reaction     []redness - adverse reaction:        15 min Therapeutic Exercise:  [x]  See flow sheet   Rationale:      increase ROM and increase strength to improve the patients ability to perform unlimted ADLs     15 min Manual Therapy: Technique:      [] S/DTM []IASTM [x]PROM  [] Passive Stretching  []manual TPR  []Jt manipulation:Gr I [] II []  III [] IV[] V[]  Treatment/Area:  L shoulder in all planes, gentle distraction and end range oscillations to dec mm guarding/pain   Rationale:      decrease pain, increase ROM, increase tissue extensibility and decrease trigger points to improve patient's ability to perform ADLs  The manual therapy interventions were performed at a separate and distinct time from the therapeutic activities interventions. 10 min Therapeutic Activity: [x]  See flow sheet   Rationale:    increase ROM, increase strength, improve coordination, and mechanics to improve the patients ability to perform UE ADLs    Billed With/As:   [] TE   [x] TA   [] Neuro   [] Self Care Patient Education: [x] Review HEP    [] Progressed/Changed HEP based on:   [x] positioning   [] body mechanics   [] transfers   [] heat/ice application    [x] other: review of positioning, activity/movement restrictions and limitations, modifications to perform ADLs     Other Objective/Functional Measures:    See FS  PROM scap to approx 120°     Post Treatment Pain Level (on 0 to 10) scale:   4  / 10     ASSESSMENT  Assessment/Changes in Function:     Good tolerance to PROM. Pt able to don/doff sling correctly with min A from      []  See Progress Note/Recertification   Patient will continue to benefit from skilled PT services to modify and progress therapeutic interventions, address functional mobility deficits, address ROM deficits, address strength deficits, analyze and address soft tissue restrictions, analyze and cue movement patterns, analyze and modify body mechanics/ergonomics, assess and modify postural abnormalities, address imbalance/dizziness and instruct in home and community integration to attain remaining goals.    Progress toward goals / Updated goals:    Progressing towards STG1     PLAN  [x]  Upgrade activities as tolerated YES Continue plan of care   []  Discharge due to :    []  Other:      Therapist: Shanika Rutherford, PT, DPT, MTC, CMTPT    Date: 6/30/2022 Time: 3:41 PM     Future Appointments   Date Time Provider Trev Ragsdale   7/1/2022  2:45 PM Nohemi Hakan, PT [facility] [address]   7/5/2022  8:30 AM Ryan Lv, PT [facility] [address]   7/6/2022  8:00 AM Jacqlyn Nisreen [facility] [address]   7/7/2022 11:45 AM Nohemi Hkaan, PT [facility] [address]   7/8/2022  8:30 AM Ryan Lv, PT [facility] [address]   7/11/2022  8:00 AM Jacqlyn Nisreen [facility] [address]   7/13/2022  7:15 AM Nohemi Hakan, PT [facility] [address]   7/15/2022 11:45 AM Nohemi Hakan, PT [facility] [address]   7/18/2022  8:45 AM Brendan Salmon, PT [facility] [address]   7/20/2022  8:00 AM Jacqlyn Nisreen [facility] [address]   7/22/2022  8:00 AM Brendan Salmon, PT [facility] [address]   7/25/2022  8:00 AM Brendan Salmon, PT [facility] [address]   7/27/2022  8:00 AM Jacqlyn Nisreen [facility] [address]   7/29/2022  8:00 AM Brendan Salmon, PT [facility] [address]   8/10/2022  8:40 AM FARTUN Shelley

## 2022-07-01 ENCOUNTER — HOSPITAL ENCOUNTER (OUTPATIENT)
Dept: PHYSICAL THERAPY | Age: 67
Discharge: HOME | End: 2022-07-01
Payer: MEDICARE

## 2022-07-01 PROCEDURE — 97140 MANUAL THERAPY 1/> REGIONS: CPT

## 2022-07-01 PROCEDURE — 97530 THERAPEUTIC ACTIVITIES: CPT

## 2022-07-01 PROCEDURE — 97110 THERAPEUTIC EXERCISES: CPT

## 2022-07-01 NOTE — PROGRESS NOTES
PHYSICAL THERAPY - DAILY TREATMENT NOTE    Patient Name: Jarad Mora        Date: 2022  : 1955   YES Patient  Verified  Visit #:   3   of   12  Insurance: Payor: Nicolás Alt / Plan: [place] MEDICARE PART A & B / Product Type: Medicare /      In time: 250 Out time: 340   Total Treatment Time: 50     BCBS/Medicare Time Tracking (below)   Total Timed Codes (min):  40 1:1 Treatment Time:  40     TREATMENT AREA =  Pain in left shoulder [M25.512]    SUBJECTIVE  Pain Level (on 0 to 10 scale):  5  / 10   Medication Changes/New allergies or changes in medical history, any new surgeries or procedures? NO    If yes, update Summary List   Subjective Functional Status/Changes:  []  No changes reported     I had some severe pain when putting my sling on this morning, I am not sure if I am doing it correctly. Modalities Rationale:     decrease edema, decrease inflammation and decrease pain to improve patient's ability to perform pain-free ADLs.     min [] Estim, type/location:                                      []  att     []  unatt     []  w/US     []  w/ice    []  w/heat    min []  Mechanical Traction: type/lbs                   []  pro   []  sup   []  int   []  cont    []  before manual    []  after manual    min []  Ultrasound, settings/location:      min []  Iontophoresis w/ dexamethasone, location:                                               []  take home patch       []  in clinic   10 min [x]  Ice     []  Heat    location/position: L shoulder seated    min []  Vasopneumatic Device, press/temp:    If using vaso (only need to measure limb vaso being performed on)      pre-treatment girth :       post-treatment girth :       measured at (landmark location) :      min []  Other:    [x] Skin assessment post-treatment (if applicable):    [x]  intact    [x]  redness- no adverse reaction                  []redness - adverse reaction:        15 min Therapeutic Exercise:  [x]  See flow sheet   Rationale: increase ROM, increase strength and improve coordination to improve the patients ability to perform unlimited ADLs. 15 min Manual Therapy: PROM and gentle stretching into flexion, ER, ABD, oscillations to promote muscle relaxation   Rationale:      decrease pain, increase ROM, increase tissue extensibility and decrease edema  to improve patient's ability to improve ROM per protocol  The manual therapy interventions were performed at a separate and distinct time from the therapeutic activities interventions. 10 min Therapeutic Activity: [x]  See flow sheet  Discussed donning/doffing of sling   Rationale:    increase ROM, increase strength and improve coordination to improve the patients ability to protect integrity of repair    Billed With/As:   [x] TE   [] TA   [] Neuro   [] Self Care Patient Education: [x] Review HEP    [] Progressed/Changed HEP based on:   [] positioning   [] body mechanics   [] transfers   [] heat/ice application    [] other:      Other Objective/Functional Measures: Added chair flexion walk out     Post Treatment Pain Level (on 0 to 10) scale:   3  / 10     ASSESSMENT  Assessment/Changes in Function:     Pt was able to don/doff sling maintaining PROM precautions of LUE and was able to perform without any symptoms. Good reduction of pain with session today. []  See Progress Note/Recertification   Patient will continue to benefit from skilled PT services to modify and progress therapeutic interventions, address functional mobility deficits, address ROM deficits, address strength deficits, analyze and address soft tissue restrictions, analyze and cue movement patterns, analyze and modify body mechanics/ergonomics and assess and modify postural abnormalities to attain remaining goals. Progress toward goals / Updated goals:    Progressing towards STG 3.       PLAN  [x]  Upgrade activities as tolerated YES Continue plan of care   []  Discharge due to :    []  Other: Therapist: Bobby Cedeno DPT    Date: 7/1/2022 Time: 4:00 PM     Future Appointments   Date Time Provider Trev Leoi   7/5/2022  8:30 AM Jessica Victor, PT [facility]   7/6/2022  8:00 AM Milus Linen [facility]   7/7/2022 11:45 AM Fabienne Fisher, PT [facility]   7/8/2022  8:30 AM Jessica Victor, PT [facility]   7/11/2022  8:00 AM Milus Linen [facility]   7/13/2022  7:15 AM Fabienne Fisher, PT [facility] [place] [facility]   7/15/2022 11:45 AM Fabienne Fisher, PT [facility]   7/18/2022  8:45 AM Razia Warner, PT [facility]   7/20/2022  8:00 AM Milus Linen [facility]   7/22/2022  8:00 AM Razia Warner, PT [facility]   7/25/2022  8:00 AM Razia Warner, PT [facility]   7/27/2022  8:00 AM Milus Linen [facility]   7/29/2022  8:00 AM Razia Warner, PT [facility]   8/10/2022  8:40 AM Yola Babb, NP [address] [facility]

## 2022-07-05 ENCOUNTER — HOSPITAL ENCOUNTER (OUTPATIENT)
Dept: PHYSICAL THERAPY | Age: 67
Discharge: HOME | End: 2022-07-05
Payer: MEDICARE

## 2022-07-05 PROCEDURE — 97110 THERAPEUTIC EXERCISES: CPT

## 2022-07-05 PROCEDURE — 97140 MANUAL THERAPY 1/> REGIONS: CPT

## 2022-07-05 NOTE — PROGRESS NOTES
PHYSICAL THERAPY - DAILY TREATMENT NOTE     Patient Name: Myrtle Eden        Date: 2022  : 1955   YES Patient  Verified  Visit #:     Insurance: Payor: Tamika Peres / Plan: [place] MEDICARE PART A & B / Product Type: Medicare /      In time: 471 Out time: 069   Total Treatment Time: 45     Medicare/BCBS Time Tracking (below)   Total Timed Codes (min):  45 1:1 Treatment Time:  35     TREATMENT AREA =  Pain in left shoulder [M25.512]    SUBJECTIVE    Pain Level (on 0 to 10 scale):  0  / 10   Medication Changes/New allergies or changes in medical history, any new surgeries or procedures?     NO    If yes, update Summary List   Subjective Functional Status/Changes:  []  No changes reported       Functional improvements: \"I'm doing better putting on the sling and haven't had anymore pain\"  Functional impairments: Decreased strength and ROM affecting all ADL's         OBJECTIVE  Modalities Rationale:     decrease inflammation and decrease pain to improve patient's ability to perform ADL's w/o pain      min [] Estim, type/location:                                      []  att     []  unatt     []  w/US     []  w/ice    []  w/heat    min []  Mechanical Traction: type/lbs                   []  pro   []  sup   []  int   []  cont    []  before manual    []  after manual    min []  Ultrasound, settings/location:      min []  Iontophoresis w/ dexamethasone, location:                                               []  take home patch       []  in clinic   10 min [x]  Ice     []  Heat    location/position: Supine to left shoulder    min []  Vasopneumatic Device, press/temp:  If using vaso (only need to measure limb vaso being performed on)      pre-treatment girth :       post-treatment girth :       measured at (landmark location) :       min []  Other:    [x] Skin assessment post-treatment (if applicable):    [x]  intact    [x]  redness- no adverse reaction     []redness - adverse reaction:      15 min Therapeutic Exercise:  [x]  See flow sheet   Rationale:      increase ROM and increase strength to improve the patients ability to perform ADL's     20 min Manual Therapy: Technique:      [] S/DTM []IASTM [x]PROM [x] Passive Stretching   []manual TPR    []Jt manipulation:Gr I [] II []  III [] IV[] V[]  Treatment Area:  Left shoulder all planes   Rationale:      decrease pain, increase ROM and increase tissue extensibility to improve patient's ability to perform ADL's and prepare for AAROm. The manual therapy interventions were performed at a separate and distinct time from the therapeutic activities interventions. Billed With/As:   [x] TE   [] TA   [] Neuro   [] Self Care Patient Education: [x] Review HEP    [] Progressed/Changed HEP based on:   [] positioning   [] body mechanics   [] transfers   [] heat/ice application    [] other:        Other Objective/Functional Measures:    See FS   Post Treatment Pain Level (on 0 to 10) scale:   0  / 10     ASSESSMENT    Assessment/Changes in Function:     Patient continues to make gains in PROM and understanding of precautions. Demonstrated independence w/ Don/Doff sling w/o AROM of the shoulder. []  See Progress Note/Recertification   Patient will continue to benefit from skilled PT services to modify and progress therapeutic interventions, address functional mobility deficits, address ROM deficits, address strength deficits, analyze and address soft tissue restrictions, analyze and cue movement patterns, analyze and modify body mechanics/ergonomics and assess and modify postural abnormalities to attain remaining goals. Progress toward goals / Updated goals:    1) Patient to be I and compliant with a basic home exercise program in order to allow for ease with comfort within sling. MET  2) Patient will improve PROM to flexion 0-160 degrees, ER to 60 degrees in order to allow for ease with eventual OH lifting. Good Progress as noted by PROM today  3) Patient will decrease pain to less than 2/10 at most in order to allow for ease with ADLs.  Good Progress, looking for consistency of pain relief     PLAN    [x]  Upgrade activities as tolerated YES Continue plan of care   []  Discharge due to :    []  Other:      Therapist: Kae Dubin, PT    Date: 7/5/2022 Time: 8:02 AM     Future Appointments   Date Time Provider Trev Melyssa   7/5/2022  8:30 AM Rachna Bolton, PT [facility]   7/6/2022  8:00 AM Ozmelina Nunezhoff [facility]   7/7/2022 11:45 AM Cecillia Chidi, PT [facility]   7/8/2022  8:30 AM Rachna Bolton, PT [facility]   7/11/2022  8:00 AM Ozmelina Steenff [facility]   7/13/2022  7:15 AM Savanaa Chidi, PT [facility] [place] [facility]   7/15/2022 11:45 AM Jensenillia Chidi, PT [facility]   7/18/2022  8:45 AM Xander Downs, PT [facility]   7/20/2022  8:00 AM Ozmelina Nunezhoff [facility]   7/22/2022  8:00 AM Schneider Breath, PT [facility]   7/25/2022  8:00 AM Schneider Breath, PT [facility]   7/27/2022  8:00 AM Ozie Aschoff [facility]   7/29/2022  8:00 AM Schneider Breath, PT [facility]   8/10/2022  8:40 AM Morgan Pandya NP [address] [facility]

## 2022-07-06 ENCOUNTER — HOSPITAL ENCOUNTER (OUTPATIENT)
Dept: PHYSICAL THERAPY | Age: 67
Discharge: HOME | End: 2022-07-06
Payer: MEDICARE

## 2022-07-06 PROCEDURE — 97110 THERAPEUTIC EXERCISES: CPT

## 2022-07-06 PROCEDURE — 97140 MANUAL THERAPY 1/> REGIONS: CPT

## 2022-07-06 NOTE — PROGRESS NOTES
PHYSICAL THERAPY - DAILY TREATMENT NOTE    Patient Name: Myrtel Eden        Date: 2022  : 1955   YES Patient  Verified  Visit #:      12  Insurance: Payor: Tamika Peres / Plan: [place] MEDICARE PART A & B / Product Type: Medicare /      In time: 832 Out time: 643   Total Treatment Time: 50     BCBS/Medicare Time Tracking (below)   Total Timed Codes (min):  40 1:1 Treatment Time:  40     TREATMENT AREA =  Pain in left shoulder [M25.512]    SUBJECTIVE  Pain Level (on 0 to 10 scale):  0  / 10   Medication Changes/New allergies or changes in medical history, any new surgeries or procedures? NO    If yes, update Summary List   Subjective Functional Status/Changes:  []  No changes reported     \"sometimes doing the walk back stretch feels tight/uncomfortable in the back\"  Patient reports no new pains and is getting her sling better.          Modalities Rationale:     decrease inflammation and decrease pain to improve patient's ability to perform pain free ADLs    min [] Estim, type/location:                                      []  att     []  unatt     []  w/US     []  w/ice    []  w/heat    min []  Mechanical Traction: type/lbs                   []  pro   []  sup   []  int   []  cont    []  before manual    []  after manual    min []  Ultrasound, settings/location:      min []  Iontophoresis w/ dexamethasone, location:                                               []  take home patch       []  in clinic   10 min [x]  Ice     []  Heat    location/position: CP to L shoulder in supine    min []  Vasopneumatic Device, press/temp:    If using vaso (only need to measure limb vaso being performed on)      pre-treatment girth :       post-treatment girth :       measured at (landmark location) :      min []  Other:    [] Skin assessment post-treatment (if applicable):    []  intact    []  redness- no adverse reaction                  []redness - adverse reaction:        15 min Therapeutic Exercise:  [x]  See flow sheet   Rationale:      increase ROM, increase strength, improve coordination, improve balance and increase proprioception to improve the patients ability to improve comfort within sling     25 min Manual Therapy: PROM into abduction, ER @ 25 degrees and 45 degrees ABD, and flexion/scaption with moderate cues for relaxation   Rationale:      decrease pain, increase ROM and increase tissue extensibility to improve patient's ability to perform pain free OH motion within surgical time frames  The manual therapy interventions were performed at a separate and distinct time from the therapeutic activities interventions. Billed With/As:   [] TE   [] TA   [] Neuro   [] Self Care Patient Education: [x] Review HEP    [] Progressed/Changed HEP based on:   [] positioning   [] body mechanics   [] transfers   [] heat/ice application    [] other:      Other Objective/Functional Measures: Added UT stretch  Cues for proper form during chair back walk out with increased emphasis on relaxation and achieving stretch, placing opposite UE on firm stable surface to allow further relaxation of the LUE     Post Treatment Pain Level (on 0 to 10) scale:   0  / 10     ASSESSMENT  Assessment/Changes in Function:     Patient had some stiffness into ER but with time motion improved with passive range of motion today. []  See Progress Note/Recertification   Patient will continue to benefit from skilled PT services to modify and progress therapeutic interventions, address functional mobility deficits, address ROM deficits, address strength deficits, analyze and address soft tissue restrictions, analyze and cue movement patterns, analyze and modify body mechanics/ergonomics, assess and modify postural abnormalities, address imbalance/dizziness and instruct in home and community integration to attain remaining goals.    Progress toward goals / Updated goals:    Progressing towards LTG 2     PLAN  [x]  Upgrade activities as tolerated YES Continue plan of care   []  Discharge due to :    []  Other:      Therapist: Katy Camargo    Date: 7/6/2022 Time: 8:40 AM     Future Appointments   Date Time Provider Trev Ragsdale   7/7/2022 11:45 AM Luan Gonzalez, PT [facility]   7/8/2022  8:30 AM Omar Aguila, PT [facility]   7/11/2022  8:00 AM Aggie Chris [facility]   7/13/2022  7:15 AM Luan Gonzalez, PT [facility]   7/15/2022 11:45 AM Luan Gonzalez, PT [facility]   7/18/2022  8:45 AM Yasmin Bernarder, PT [facility]   7/20/2022  8:00 AM Aggie Chris [facility]   7/22/2022  8:00 AM Yasmin Mixer, PT [facility]   7/25/2022  8:00 AM Yasmin Mixer, PT [facility]   7/27/2022  8:00 AM Aggie Chris [facility]   7/29/2022  8:00 AM Yasmin Mixer, PT [facility]   8/10/2022  8:40 AM Elise Tomas NP [address] [facility]

## 2022-07-07 ENCOUNTER — HOSPITAL ENCOUNTER (OUTPATIENT)
Dept: PHYSICAL THERAPY | Age: 67
Discharge: HOME | End: 2022-07-07
Payer: MEDICARE

## 2022-07-07 PROCEDURE — 97110 THERAPEUTIC EXERCISES: CPT

## 2022-07-07 PROCEDURE — 97140 MANUAL THERAPY 1/> REGIONS: CPT

## 2022-07-07 NOTE — PROGRESS NOTES
PHYSICAL THERAPY - DAILY TREATMENT NOTE    Patient Name: Meka Maldonado        Date: 2022  : 1955   YES Patient  Verified  Visit #:     Insurance: Payor: Lavern Dia / Plan: [place] MEDICARE PART A & B / Product Type: Medicare /      In time:  Out time: 51   Total Treatment Time: 50     BCBS/Medicare Time Tracking (below)   Total Timed Codes (min):  40 1:1 Treatment Time:  40     TREATMENT AREA =  Pain in left shoulder [M25.512]    SUBJECTIVE  Pain Level (on 0 to 10 scale):  0  / 10   Medication Changes/New allergies or changes in medical history, any new surgeries or procedures? NO    If yes, update Summary List   Subjective Functional Status/Changes:  []  No changes reported     I have been feeling much better and am no longer taking tylenol. MD was happy with my progress at my visits; wants PT to include stretching/strengthening for my R shoulder as well. Modalities Rationale:     decrease edema, decrease inflammation, decrease pain and increase tissue extensibility to improve patient's ability to perform pain-free ADLs.     min [] Estim, type/location:                                      []  att     []  unatt     []  w/US     []  w/ice    []  w/heat    min []  Mechanical Traction: type/lbs                   []  pro   []  sup   []  int   []  cont    []  before manual    []  after manual    min []  Ultrasound, settings/location:      min []  Iontophoresis w/ dexamethasone, location:                                               []  take home patch       []  in clinic   10 min [x]  Ice     []  Heat    location/position: L shoulder supine    min []  Vasopneumatic Device, press/temp:    If using vaso (only need to measure limb vaso being performed on)      pre-treatment girth :       post-treatment girth :       measured at (landmark location) :      min []  Other:    [] Skin assessment post-treatment (if applicable):    []  intact    []  redness- no adverse reaction []redness - adverse reaction:        15 min Therapeutic Exercise:  [x]  See flow sheet   Rationale:      increase ROM, increase strength, improve coordination and increase proprioception to improve the patients ability to perform unlimited ADLs. 25 min Manual Therapy: PROM and gentle stretching into flexion, ER, ABD, oscillations to promote muscle relaxation   Rationale:      decrease pain, increase ROM, increase tissue extensibility and decrease edema  to improve patient's ability to improve ROM per protocol  The manual therapy interventions were performed at a separate and distinct time from the therapeutic activities interventions. Billed With/As:   [x] TE   [] TA   [] Neuro   [] Self Care Patient Education: [x] Review HEP    [] Progressed/Changed HEP based on:   [] positioning   [] body mechanics   [] transfers   [] heat/ice application    [] other:      Other Objective/Functional Measures:    TE per FS       Post Treatment Pain Level (on 0 to 10) scale:   0  / 10     ASSESSMENT  Assessment/Changes in Function:     Cues required today to allow for appropriate muscle relaxation during manual stretches. Some ROM restrictions noted when pt first arrived; was encouraged with home exercises (chair walk back) to achieve stretch and avoid pain in order to prevent re-activation of inflammatory process. []  See Progress Note/Recertification   Patient will continue to benefit from skilled PT services to modify and progress therapeutic interventions, address functional mobility deficits, address ROM deficits, address strength deficits, analyze and address soft tissue restrictions, analyze and cue movement patterns, analyze and modify body mechanics/ergonomics and assess and modify postural abnormalities to attain remaining goals. Progress toward goals / Updated goals:    Progressing towards STG 3..       PLAN  [x]  Upgrade activities as tolerated YES Continue plan of care   []  Discharge due to :    [] Other:      Therapist: Koffi Swartz DPT    Date: 7/7/2022 Time: 1:55 PM     Future Appointments   Date Time Provider Trev Ragsdale   7/8/2022  8:30 AM Caron Gomes, PT [facility]   7/11/2022  8:00 AM Carol Larson [facility]   7/13/2022  7:15 AM Shaun Orozco, PT [facility]   7/15/2022 11:45 AM Shaun Orozco, PT [facility]   7/18/2022  8:45 AM Lamar Masterson, PT [facility]   7/20/2022  8:00 AM Carol Larson [facility]   7/22/2022  8:00 AM Lamar Masterson, PT [facility]   7/25/2022  8:00 AM Lamar Masterson, PT [facility]   7/27/2022  8:00 AM Carol Larson [facility]   7/29/2022  8:00 AM Lamar Masterson, PT [facility]   8/10/2022  8:40 AM Moses Joseph, NP [address] [facility]

## 2022-07-08 ENCOUNTER — HOSPITAL ENCOUNTER (OUTPATIENT)
Dept: PHYSICAL THERAPY | Age: 67
Discharge: HOME | End: 2022-07-08
Payer: MEDICARE

## 2022-07-08 PROCEDURE — 97110 THERAPEUTIC EXERCISES: CPT

## 2022-07-08 PROCEDURE — 97140 MANUAL THERAPY 1/> REGIONS: CPT

## 2022-07-08 NOTE — PROGRESS NOTES
PHYSICAL THERAPY - DAILY TREATMENT NOTE     Patient Name: Geneva Shukla        Date: 2022  : 1955   YES Patient  Verified  Visit #:     Insurance: Payor: Agustin Martínezt / Plan: [place] MEDICARE PART A & B / Product Type: Medicare /      In time:  Out time: 864   Total Treatment Time: 45     Medicare/BCBS Time Tracking (below)   Total Timed Codes (min):  35 1:1 Treatment Time:  35     TREATMENT AREA =  Pain in left shoulder [M25.512]    SUBJECTIVE    Pain Level (on 0 to 10 scale):  4  / 10   Medication Changes/New allergies or changes in medical history, any new surgeries or procedures? NO    If yes, update Summary List   Subjective Functional Status/Changes:  []  No changes reported       Functional improvements: \"I am really really sore after yesterday's session. I've had to start back on the Tylenol\" Patient reports MD gave her an order for the R arm.   Functional impairments: Decreased ROM and strength affecting all ADL's         OBJECTIVE  Modalities Rationale:     decrease inflammation and decrease pain to improve patient's ability to perform ADL's w/o pain      min [] Estim, type/location:                                      []  att     []  unatt     []  w/US     []  w/ice    []  w/heat    min []  Mechanical Traction: type/lbs                   []  pro   []  sup   []  int   []  cont    []  before manual    []  after manual    min []  Ultrasound, settings/location:      min []  Iontophoresis w/ dexamethasone, location:                                               []  take home patch       []  in clinic   10 min [x]  Ice     []  Heat    location/position: Supine to left shoulder    min []  Vasopneumatic Device, press/temp:  If using vaso (only need to measure limb vaso being performed on)      pre-treatment girth :       post-treatment girth :       measured at (landmark location) :       min []  Other:    [x] Skin assessment post-treatment (if applicable):    [x]  intact    [x] redness- no adverse reaction     []redness - adverse reaction:      10 min Therapeutic Exercise:  [x]  See flow sheet   Rationale:      increase ROM to improve the patients ability to perform ADL's     25 min Manual Therapy: Technique:      [] S/DTM []IASTM [x]PROM [x] Passive Stretching   []manual TPR    []Jt manipulation:Gr I [] II [x]  III [] IV[] V[]  Treatment Area: PROM all planes, gentle oscillations to facilitate muscle relaxation    Rationale:      decrease pain, increase ROM and increase tissue extensibility to improve patient's ability to perform ADL's and prepare for AAROM. The manual therapy interventions were performed at a separate and distinct time from the therapeutic activities interventions. Billed With/As:   [x] TE   [] TA   [] Neuro   [] Self Care Patient Education: [x] Review HEP    [] Progressed/Changed HEP based on:   [] positioning   [] body mechanics   [] transfers   [] heat/ice application    [] other:        Other Objective/Functional Measures:         Post Treatment Pain Level (on 0 to 10) scale:   0  / 10     ASSESSMENT    Assessment/Changes in Function:     Improved pain following manual treatment. Encouraged patient to perform ROM at home and be mindful of excessive guarding even when in the sling. Continues to make good gains in ROM despite recent increase in pain. []  See Progress Note/Recertification   Patient will continue to benefit from skilled PT services to modify and progress therapeutic interventions, address functional mobility deficits, address ROM deficits, address strength deficits, analyze and address soft tissue restrictions, analyze and cue movement patterns, analyze and modify body mechanics/ergonomics and assess and modify postural abnormalities to attain remaining goals. Progress toward goals / Updated goals:    2) Patient will improve PROM to flexion 0-160 degrees, ER to 60 degrees in order to allow for ease with eventual OH lifting. Good Progress as noted by PROM today  3) Patient will decrease pain to less than 2/10 at most in order to allow for ease with ADLs.  Good Progress, despite increase in pain today.      PLAN    [x]  Upgrade activities as tolerated YES Continue plan of care   []  Discharge due to :    []  Other:      Therapist: Peri Schaffer PT    Date: 7/8/2022 Time: 8:08 AM     Future Appointments   Date Time Provider Trev Ragsdale   7/8/2022  8:30 AM Omar Aguila, PT [facility]   7/11/2022  8:00 AM Aggie Chris [facility]   7/13/2022  7:15 AM Luan Gonzalez, PT [facility]   7/15/2022 11:45 AM Luan Gonzalez, PT [facility]   7/18/2022  8:45 AM Yasmin Mixer, PT [facility]   7/20/2022  8:00 AM Aggie Chris [facility]   7/22/2022  8:00 AM Yasmin Mixer, PT [facility]   7/25/2022  8:00 AM Yasmin Mixer, PT [facility]   7/27/2022  8:00 AM Aggie Chris [facility]   7/29/2022  8:00 AM Yasmin Mixer, PT [facility]   8/10/2022  8:40 AM FARTUN Britton

## 2022-07-11 ENCOUNTER — HOSPITAL ENCOUNTER (OUTPATIENT)
Dept: PHYSICAL THERAPY | Age: 67
Discharge: HOME | End: 2022-07-11
Payer: MEDICARE

## 2022-07-11 PROCEDURE — 97140 MANUAL THERAPY 1/> REGIONS: CPT

## 2022-07-11 PROCEDURE — 97110 THERAPEUTIC EXERCISES: CPT

## 2022-07-11 NOTE — PROGRESS NOTES
PHYSICAL THERAPY - DAILY TREATMENT NOTE    Patient Name: Jarad Mora        Date: 2022  : 1955   YES Patient  Verified  Visit #:     Insurance: Payor: Nicolás Alt / Plan: [place] MEDICARE PART A & B / Product Type: Medicare /      In time: 800 Out time: 850   Total Treatment Time: 50     BCBS/Medicare Time Tracking (below)   Total Timed Codes (min):  40 1:1 Treatment Time:  40     TREATMENT AREA =  Pain in left shoulder [M25.512]    SUBJECTIVE  Pain Level (on 0 to 10 scale):  0  / 10   Medication Changes/New allergies or changes in medical history, any new surgeries or procedures? NO    If yes, update Summary List   Subjective Functional Status/Changes:  []  No changes reported     Patient reports that she has overall been feeling pretty good, she has resumed taking tyenol about 3x per day and does her stretches throughout the day.             Modalities Rationale:     decrease inflammation and decrease pain to improve patient's ability to perform pain free ADLs    min [] Estim, type/location:                                      []  att     []  unatt     []  w/US     []  w/ice    []  w/heat    min []  Mechanical Traction: type/lbs                   []  pro   []  sup   []  int   []  cont    []  before manual    []  after manual    min []  Ultrasound, settings/location:      min []  Iontophoresis w/ dexamethasone, location:                                               []  take home patch       []  in clinic   10 min [x]  Ice     []  Heat    location/position: B shoulders in supine     min []  Vasopneumatic Device, press/temp:    If using vaso (only need to measure limb vaso being performed on)      pre-treatment girth :       post-treatment girth :       measured at (landmark location) :      min []  Other:    [] Skin assessment post-treatment (if applicable):    []  intact    []  redness- no adverse reaction                  []redness - adverse reaction:        15 min Therapeutic Exercise:  [x]  See flow sheet   Rationale:      increase ROM, increase strength, improve coordination, improve balance and increase proprioception to improve the patients ability to perform ADLs with comfort within sling     25 min Manual Therapy: PROM of the L shoulder into flexion/scaption, abduction and ER. TPR to R infraspinatus/ teres mm group in supine, manual R posterior capsule stretch and PROM into IR at 90 degrees with stabilization of R humeral head    Rationale:      decrease pain, increase ROM and increase tissue extensibility to improve patient's ability to perform pain free ADLs   The manual therapy interventions were performed at a separate and distinct time from the therapeutic activities interventions. Billed With/As:   [] TE   [] TA   [] Neuro   [] Self Care Patient Education: [x] Review HEP    [] Progressed/Changed HEP based on:   [] positioning   [] body mechanics   [] transfers   [] heat/ice application    [] other:      Other Objective/Functional Measures:    Tightness into ER at 45 degrees of ABD at beginning of session to about neutral but with stretching and time motion improved to about 40 degrees. Min cues for relaxation throughout therapy      Post Treatment Pain Level (on 0 to 10) scale:   0  / 10     ASSESSMENT  Assessment/Changes in Function:     No increase in pain throughout the L shoulder today with manual therapy with gradual improvements in range of motion with passive stretching today. Decrease in overall right shoulder pain after manual therapy today.      []  See Progress Note/Recertification   Patient will continue to benefit from skilled PT services to modify and progress therapeutic interventions, address functional mobility deficits, address ROM deficits, address strength deficits, analyze and address soft tissue restrictions, analyze and cue movement patterns, analyze and modify body mechanics/ergonomics, assess and modify postural abnormalities, address imbalance/dizziness and instruct in home and community integration to attain remaining goals. Progress toward goals / Updated goals:    Progressing towards LTG 2.       PLAN  [x]  Upgrade activities as tolerated YES Continue plan of care   []  Discharge due to :    []  Other:      Therapist: Daisy Harris    Date: 7/11/2022 Time: 8:49 AM     Future Appointments   Date Time Provider Trev Ragsdale   7/13/2022  7:15 AM Micheal Orellana, PT [facility]   7/15/2022 11:45 AM Micheal Orellana, PT [facility]   7/18/2022  8:45 AM Melinda Lumpkin, PT [facility]   7/20/2022  8:00 AM Atlanta Razor [facility]   7/22/2022  8:00 AM Melinda Lumpkin, PT [facility]   7/25/2022  8:00 AM Melinda Lumpkin, PT [facility]   7/27/2022  8:00 AM [place] Razor [facility]   7/29/2022  8:00 AM Melinda Lumpkin, PT [facility]   8/10/2022  8:40 AM Cullen Cheatham NP [facility]

## 2022-07-13 ENCOUNTER — HOSPITAL ENCOUNTER (OUTPATIENT)
Dept: PHYSICAL THERAPY | Age: 67
Discharge: HOME | End: 2022-07-13
Payer: MEDICARE

## 2022-07-13 PROCEDURE — 97140 MANUAL THERAPY 1/> REGIONS: CPT

## 2022-07-13 PROCEDURE — 97110 THERAPEUTIC EXERCISES: CPT

## 2022-07-13 NOTE — PROGRESS NOTES
PHYSICAL THERAPY - DAILY TREATMENT NOTE    Patient Name: Vicki Ramos        Date: 2022  : 1955   YES Patient  Verified  Visit #:     Insurance: Payor: Farooq Keep / Plan: [place] MEDICARE PART A & B / Product Type: Medicare /      In time: 564 Out time: 900   Total Treatment Time: 60     BCBS/Medicare Time Tracking (below)   Total Timed Codes (min):  50 1:1 Treatment Time:  50     TREATMENT AREA =  Pain in left shoulder [M25.512]    SUBJECTIVE  Pain Level (on 0 to 10 scale):  0  / 10   Medication Changes/New allergies or changes in medical history, any new surgeries or procedures? NO    If yes, update Summary List   Subjective Functional Status/Changes:  []  No changes reported     I am consistently taking tylenol 3x/day. I have most soreness in my elbow when I do my exercises because it is getting stretched. Doing the chair walk back is easier when I remember to relax my arm prior       Modalities Rationale:     decrease edema, decrease inflammation and decrease pain to improve patient's ability to perform pain-free ADLs.     min [] Estim, type/location:                                      []  att     []  unatt     []  w/US     []  w/ice    []  w/heat    min []  Mechanical Traction: type/lbs                   []  pro   []  sup   []  int   []  cont    []  before manual    []  after manual    min []  Ultrasound, settings/location:      min []  Iontophoresis w/ dexamethasone, location:                                               []  take home patch       []  in clinic   10 min [x]  Ice     []  Heat    location/position: B shoulders seated    min []  Vasopneumatic Device, press/temp:    If using vaso (only need to measure limb vaso being performed on)      pre-treatment girth :       post-treatment girth :       measured at (landmark location) :      min []  Other:    [x] Skin assessment post-treatment (if applicable):    [x]  intact    [x]  redness- no adverse reaction []redness - adverse reaction:        24 min Therapeutic Exercise:  [x]  See flow sheet   Rationale:      increase ROM, increase strength, improve coordination and increase proprioception to improve the patients ability to perform unlimited ADLs. 26 min Manual Therapy: PROM and gentle stretching into flexion and ER at varying degrees of ABD; STM along R shoulder RC musculature, PA mobs of GHJ of R shoulder   Rationale:      decrease pain, increase ROM, increase tissue extensibility and decrease edema  to improve patient's ability to improve mobility per protocol  The manual therapy interventions were performed at a separate and distinct time from the therapeutic activities interventions. Billed With/As:   [x] TE   [] TA   [] Neuro   [] Self Care Patient Education: [x] Review HEP    [] Progressed/Changed HEP based on:   [] positioning   [] body mechanics   [] transfers   [] heat/ice application    [] other:      Other Objective/Functional Measures: Added Tband Row and IR/ER with R shoulder only      Post Treatment Pain Level (on 0 to 10) scale:   0  / 10     ASSESSMENT  Assessment/Changes in Function:     Pt had some limitations in ER on arrival that improved with manual treatment. Some popping noted during mid-range flexion that decreased after cues for muscle relaxation. []  See Progress Note/Recertification   Patient will continue to benefit from skilled PT services to modify and progress therapeutic interventions, address functional mobility deficits, address ROM deficits, address strength deficits, analyze and address soft tissue restrictions, analyze and cue movement patterns, analyze and modify body mechanics/ergonomics and assess and modify postural abnormalities to attain remaining goals. Progress toward goals / Updated goals:    Progressing towards STG 3.       PLAN  [x]  Upgrade activities as tolerated YES Continue plan of care   []  Discharge due to :    []  Other:      Therapist: Meggan Yancey Laron Smith, BRYAN    Date: 7/13/2022 Time: 8:00 AM     Future Appointments   Date Time Provider Trev Leoi   7/15/2022 11:45 AM Mery Woodruff, PT [facility]   7/18/2022  8:45 AM Rigo Hitchcock, PT [facility]   7/20/2022  8:00 AM Tavares Olivas [facility]   7/22/2022  8:00 AM Rigo Hitchcock, PT [facility]   7/25/2022  8:00 AM Rigo Hitchcock, PT [facility]   7/27/2022  8:00 AM Tavares Olivas [facility]   7/29/2022  8:00 AM Rigo Hitchcock, PT [facility]   8/10/2022  8:40 AM Jose J Babb, NP [address] [facility]

## 2022-07-15 ENCOUNTER — HOSPITAL ENCOUNTER (OUTPATIENT)
Dept: PHYSICAL THERAPY | Age: 67
Discharge: HOME | End: 2022-07-15
Payer: MEDICARE

## 2022-07-15 PROCEDURE — 97110 THERAPEUTIC EXERCISES: CPT

## 2022-07-15 PROCEDURE — 97140 MANUAL THERAPY 1/> REGIONS: CPT

## 2022-07-15 NOTE — PROGRESS NOTES
PHYSICAL THERAPY - DAILY TREATMENT NOTE    Patient Name: Shannon Link        Date: 7/15/2022  : 1955   YES Patient  Verified  Visit #:   10   of   12  Insurance: Payor: Mariel Coronel / Plan: [place] MEDICARE PART A & B / Product Type: Medicare /      In time: 1140 Out time: 5020   Total Treatment Time: 60     BCBS/Medicare Time Tracking (below)   Total Timed Codes (min):  50 1:1 Treatment Time:  50     TREATMENT AREA =  Pain in left shoulder [M25.512]    SUBJECTIVE  Pain Level (on 0 to 10 scale):  0  / 10   Medication Changes/New allergies or changes in medical history, any new surgeries or procedures? NO    If yes, update Summary List   Subjective Functional Status/Changes:  []  No changes reported     I am no longer having any pain in my R shoulder when manipulating my sling or getting dressed. L shoulder is slightly achy in the AM but there is no pain. Modalities Rationale:     decrease edema, decrease inflammation and decrease pain to improve patient's ability to perform pain-free ADLs.     min [] Estim, type/location:                                      []  att     []  unatt     []  w/US     []  w/ice    []  w/heat    min []  Mechanical Traction: type/lbs                   []  pro   []  sup   []  int   []  cont    []  before manual    []  after manual    min []  Ultrasound, settings/location:      min []  Iontophoresis w/ dexamethasone, location:                                               []  take home patch       []  in clinic   10 min [x]  Ice     []  Heat    location/position: B shoulders seated    min []  Vasopneumatic Device, press/temp:    If using vaso (only need to measure limb vaso being performed on)      pre-treatment girth :       post-treatment girth :       measured at (landmark location) :      min []  Other:    [x] Skin assessment post-treatment (if applicable):    [x]  intact    [x]  redness- no adverse reaction                  []redness - adverse reaction:        24 min Therapeutic Exercise:  [x]  See flow sheet   Rationale:      increase ROM, increase strength, improve coordination and increase proprioception to improve the patients ability to perform unlimited ADLs. 26 min Manual Therapy: PROM and gentle stretching into flexion and ER at varying degrees of ABD; STM along R shoulder RC musculature, PA mobs of GHJ of R shoulder   Rationale:      decrease pain, increase ROM, increase tissue extensibility and decrease edema  to improve patient's ability to improve mobility per protocol  The manual therapy interventions were performed at a separate and distinct time from the therapeutic activities interventions. Billed With/As:   [x] TE   [] TA   [] Neuro   [] Self Care Patient Education: [x] Review HEP    [] Progressed/Changed HEP based on:   [] positioning   [] body mechanics   [] transfers   [] heat/ice application    [] other:      Other Objective/Functional Measures: Added ball on wall circles on RUE     Post Treatment Pain Level (on 0 to 10) scale:   0  / 10     ASSESSMENT  Assessment/Changes in Function:     See PN to MD     []  See Progress Note/Recertification   Patient will continue to benefit from skilled PT services to modify and progress therapeutic interventions, address functional mobility deficits, address ROM deficits, address strength deficits, analyze and address soft tissue restrictions, analyze and cue movement patterns, analyze and modify body mechanics/ergonomics and assess and modify postural abnormalities to attain remaining goals. Progress toward goals / Updated goals:    See PN for progress towards goals.      PLAN  [x]  Upgrade activities as tolerated YES Continue plan of care   []  Discharge due to :    []  Other:      Therapist: Zacarias Merrill DPT    Date: 7/15/2022 Time: 8:00 AM     Future Appointments   Date Time Provider Trev Ragsdale   7/18/2022  8:45 AM Amy Stratton PT [facility]   7/20/2022  8:00 AM Claire Meza [facility]   7/22/2022  8:00 AM [place], PT [facility]   7/25/2022  8:00 AM [place], PT [facility]   7/27/2022  8:00 AM Bhargavi Price [facility]   7/29/2022  8:00 AM [place] List, PT [facility]   8/10/2022  8:40 AM Sebastián Babb Che, NP [facility] [facility]

## 2022-07-15 NOTE — PROGRESS NOTES
[facility] PHYSICAL THERAPY AT [address] [place] [address] [address], [address] [place] [phone], [address]  Phone: [phone]  Fax: [phone]  PROGRESS NOTE  Patient Name: Jenna Eid : 1955   Treatment/Medical Diagnosis: Pain in left shoulder [M25.512]   Referral Source: Rayne Brown MD     Date of Initial Visit: 2022 Attended Visits: 10 Missed Visits: 0     SUMMARY OF TREATMENT  PROM and stretching of L shoulder, HEP prescription, pt education on post-op protocol, manual therapy to improve mobility per protocol, CP for pain-control and inflamation  CURRENT STATUS  Ms. Leanne Del Rio is currently 2.5 weeks s/p L RCR, SAD, biceps tenodesis, capsular release + manipulation on 2022. Pt is demonstrating consistent improvement in ROM and good tolerance to gradual progression of capsular stretches. She is compliant with sling use and is independent with donning/doffing within post-op restrictions. With each session, pt arrives with some ROM limitations but shows good increase in motion with manual and self stretches. She is no longer using Tylenol for pain control but is icing consistently. See below for objective measures:     Goal/Measure of Progress Goal Met? 1.  Pt to be I and compliant with a basic home exercise program in order to allow for ease with comfort within sling   Status at last Eval: - Current Status: Independent and compliant daily yes   2. Pt will improve PROM to flexion 0-160 degrees, ER to 60 degrees in order to allow for ease with eventual OH lifting. Status at last Eval: Flexion 0-84 degrees,   ABD to 45 degrees  ER to neutral Current Status: Flexion 0-135 degrees,  Scaption 0-145 degrees  ABD to 45 degrees  ER to neutral Progressing    3. Patient will decrease pain to less than 2/10 at most in order to allow for ease with ADLs.     Status at last Eval: Constant 5/10 pain Current Status: 0/10 at best  1/10 aching pain in elbow  yes     New Goals to be achieved in __4-6__  weeks:  1. Patient will be I and compliant with a progressive AAROM --> AROM program starting at 6 weeks to allow for ease with ADLs. 2.  Patient will restore AROM to flexion 0-170 degrees, FXL ER to posterior occiput, FXL IR to L4 to allow for ease with dressing. 3.  Pt will increase strength of the L shoulder to 3/5 in order to allow for ease with light lifting   4. Patient will increase FOTO score to >/= 58 in order to allow for ease with reaching shoulder heigh shelves. RECOMMENDATIONS  Continue with skilled PT services 2x/week for 4-6 weeks in order to progress to LTG's and facilitate safe return to PLOF. Thank you! If you have any questions/comments please contact us directly at [phone]. Thank you for allowing us to assist in the care of your patient. Therapist Signature: Shannon Zarco DPT Date: 7/15/2022   Reporting Period: 6/29/2022-7/15/2022 Time: 11:40 AM   NOTE TO PHYSICIAN:  PLEASE COMPLETE THE ORDERS BELOW AND FAX TO   [facility] Physical Therapy: [phone]. If you are unable to process this request in 24 hours please contact our office: [phone].    ___ I have read the above report and request that my patient continue as recommended.   ___ I have read the above report and request that my patient continue therapy with the following changes/special instructions:_________________________________________________________   ___ I have read the above report and request that my patient be discharged from therapy. I certify that the above Physical Therapy Services are being furnished while the patient is under my care. I agree with the treatment plan and certify that this therapy is necessary.   Physician Signature:_______________________________________________  Date:____________________________     Time: _______________________________________  [facility]

## 2022-07-18 ENCOUNTER — HOSPITAL ENCOUNTER (OUTPATIENT)
Dept: PHYSICAL THERAPY | Age: 67
Discharge: HOME | End: 2022-07-18
Payer: MEDICARE

## 2022-07-18 PROCEDURE — 97110 THERAPEUTIC EXERCISES: CPT

## 2022-07-18 PROCEDURE — 97140 MANUAL THERAPY 1/> REGIONS: CPT

## 2022-07-18 NOTE — PROGRESS NOTES
PHYSICAL THERAPY - DAILY TREATMENT NOTE    Patient Name: Cristhian Leavitt        Date: 2022  : 1955   YES Patient  Verified  Visit #:     Insurance: Payor: Lillie Grit / Plan: [place] MEDICARE PART A & B / Product Type: Medicare /      In time: 8:40 A Out time: 9:50 A   Total Treatment Time: 65     BCBS/Medicare Time Tracking (below)   Total Timed Codes (min):  55 1:1 Treatment Time:  55     TREATMENT AREA =  Pain in left shoulder [M25.512]    SUBJECTIVE  Pain Level (on 0 to 10 scale):  0  / 10   Medication Changes/New allergies or changes in medical history, any new surgeries or procedures? NO    If yes, update Summary List   Subjective Functional Status/Changes:  []  No changes reported     Patient reports she was able to put her bra on without assist from her , her R shoulder is feeling much better.             Modalities Rationale:     decrease edema, decrease inflammation and decrease pain to improve patient's ability to return to pain-free ADLs    min [] Estim, type/location:                                      []  att     []  unatt     []  w/US     []  w/ice    []  w/heat    min []  Mechanical Traction: type/lbs                   []  pro   []  sup   []  int   []  cont    []  before manual    []  after manual    min []  Ultrasound, settings/location:      min []  Iontophoresis w/ dexamethasone, location:                                               []  take home patch       []  in clinic   10 min [x]  Ice     []  Heat    location/position: Seated to R shoulder     min []  Vasopneumatic Device, press/temp:    If using vaso (only need to measure limb vaso being performed on)      pre-treatment girth :       post-treatment girth :       measured at (landmark location) :      min []  Other:    [] Skin assessment post-treatment (if applicable):    [x]  intact    [x]  redness- no adverse reaction                  []redness - adverse reaction:        30 min Therapeutic Exercise:  [x] See flow sheet   Rationale:      increase ROM and increase strength to improve the patients ability to return to pain-free lifting using R UE     25 min Manual Therapy: PROM for L shoulder for flex/scaption, ER@ varying degrees of ABD, STM to R posterior girdle, inf GHJ to mobilizations    Rationale:      decrease pain and increase ROM to improve patient's ability to return pain-free elevation   The manual therapy interventions were performed at a separate and distinct time from the therapeutic activities interventions. Billed With/As:   [] TE   [] TA   [] Neuro   [] Self Care Patient Education: [x] Review HEP    [] Progressed/Changed HEP based on:   [] positioning   [] body mechanics   [] transfers   [] heat/ice application    [] other:      Other Objective/Functional Measures:    See flow sheet     Post Treatment Pain Level (on 0 to 10) scale:   0  / 10     ASSESSMENT  Assessment/Changes in Function:     Good reduction of pain in regards to R shoulder pain, steady improvements with L PROM     []  See Progress Note/Recertification   Patient will continue to benefit from skilled PT services to modify and progress therapeutic interventions, address functional mobility deficits, address ROM deficits, address strength deficits, analyze and address soft tissue restrictions, analyze and cue movement patterns, analyze and modify body mechanics/ergonomics, assess and modify postural abnormalities and instruct in home and community integration to attain remaining goals.    Progress toward goals / Updated goals:    Progressing towards LTG 2 & 3     PLAN  []  Upgrade activities as tolerated YES Continue plan of care   []  Discharge due to :    []  Other:      Therapist: Vonnie Richardson PT    Date: 7/18/2022 Time: 8:44 AM     Future Appointments   Date Time Provider Trev Ragsdale   7/18/2022  8:45 AM Dain Jesus PT [facility]   7/20/2022  8:00 AM Scott JIMÉNEZ [facility]   7/22/2022  8:00 AM Yareli Theresa Parekh, PT [facility]   7/25/2022  8:00 AM Kate Soto, PT [facility]   7/27/2022  8:00 AM Sydna Dancer [facility]   7/29/2022  8:00 AM Kate Soto, PT [facility]   8/10/2022  8:40 AM Jaydon Babb, NP [id number]

## 2022-07-20 ENCOUNTER — HOSPITAL ENCOUNTER (OUTPATIENT)
Dept: PHYSICAL THERAPY | Age: 67
Discharge: HOME | End: 2022-07-20
Payer: MEDICARE

## 2022-07-20 PROCEDURE — 97140 MANUAL THERAPY 1/> REGIONS: CPT

## 2022-07-20 PROCEDURE — 97110 THERAPEUTIC EXERCISES: CPT

## 2022-07-20 NOTE — PROGRESS NOTES
PHYSICAL THERAPY - DAILY TREATMENT NOTE    Patient Name: Shannon Link        Date: 2022  : 1955   YES Patient  Verified  Visit #:     Insurance: Payor: Mariel Coronel / Plan: [place] MEDICARE PART A & B / Product Type: Medicare /      In time: 967 Out time: 850   Total Treatment Time: 65     BCBS/Medicare Time Tracking (below)   Total Timed Codes (min):  55 1:1 Treatment Time:  55     TREATMENT AREA =  Pain in left shoulder [M25.512]    SUBJECTIVE  Pain Level (on 0 to 10 scale):  0  / 10   Medication Changes/New allergies or changes in medical history, any new surgeries or procedures? NO    If yes, update Summary List   Subjective Functional Status/Changes:  []  No changes reported     Patient reports that she trimmed her tere bushes, but just used her sling arm to hold the flowers. Her right arm started to bother her more but shes been fully compliant with sling precautions and maintianing immobilization.            Modalities Rationale:     decrease inflammation and decrease pain to improve patient's ability to perform pain free ADLs    min [] Estim, type/location:                                      []  att     []  unatt     []  w/US     []  w/ice    []  w/heat    min []  Mechanical Traction: type/lbs                   []  pro   []  sup   []  int   []  cont    []  before manual    []  after manual    min []  Ultrasound, settings/location:      min []  Iontophoresis w/ dexamethasone, location:                                               []  take home patch       []  in clinic   10 min [x]  Ice     []  Heat    location/position: CP to bilateral shoulders in supine     min []  Vasopneumatic Device, press/temp:    If using vaso (only need to measure limb vaso being performed on)      pre-treatment girth :       post-treatment girth :       measured at (landmark location) :      min []  Other:    [] Skin assessment post-treatment (if applicable):    []  intact    []  redness- no adverse reaction                  []redness - adverse reaction:        30 min Therapeutic Exercise:  [x]  See flow sheet   Rationale:      increase ROM, increase strength, improve coordination, improve balance, and increase proprioception to improve the patients ability to perform pain free ADLs      25 min Manual Therapy: PROM of the L shoulder into flexion/scaption, abduction, and ER.  R shoulder STM and DTM to posterior L shoulder girdle. Rationale:      decrease pain, increase ROM, and decrease trigger points to improve patient's ability to perform pain free movements with the RUE and eventual OH motion with LUE  The manual therapy interventions were performed at a separate and distinct time from the therapeutic activities interventions. Billed With/As:   [] TE   [] TA   [] Neuro   [] Self Care Patient Education: [x] Review HEP    [] Progressed/Changed HEP based on:   [] positioning   [] body mechanics   [] transfers   [] heat/ice application    [] other:      Other Objective/Functional Measures:    Held ball on wall, began session with standing R shoulder strengthening with sling donned. Flexion today: 0-148 degrees passively, ER at 45 degrees ABD: 30 degrees with notable stiffness at beginning of session     Post Treatment Pain Level (on 0 to 10) scale:   0  / 10     ASSESSMENT  Assessment/Changes in Function:     Patient tolerated manual therapy well without increase in overall pain but required frequent cues to maintain relaxation.      []  See Progress Note/Recertification   Patient will continue to benefit from skilled PT services to modify and progress therapeutic interventions, address functional mobility deficits, address ROM deficits, address strength deficits, analyze and address soft tissue restrictions, analyze and cue movement patterns, analyze and modify body mechanics/ergonomics, assess and modify postural abnormalities, address imbalance/dizziness, and instruct in home and community integration to attain remaining goals. Progress toward goals / Updated goals:    Progressing towards LTG 2.       PLAN  [x]  Upgrade activities as tolerated YES Continue plan of care   []  Discharge due to :    []  Other:      Therapist: Daisy Harris    Date: 7/20/2022 Time: 8:49 AM     Future Appointments   Date Time Provider Trev Ragsdale   7/22/2022  8:00 AM Melinda Claiborne, PT [facility]   7/25/2022  8:00 AM Melinda Claiborne, PT [facility]   7/27/2022  8:00 AM [place] Razor [facility]   7/29/2022  8:00 AM Melinda Claiborne, PT [facility]   8/1/2022  7:15 AM Melinda Claiborne, PT [facility]   8/3/2022  8:00 AM Jey Razor [facility]   8/5/2022  7:15 AM Melinda Claiborne, PT [facility]   8/8/2022  8:30 AM Reji Abbasi, PT [facility]   8/10/2022  7:15 AM Micheal Orellana, PT [facility]   8/10/2022  8:40 AM Tomi Babb, NP [facility]   8/12/2022  8:30 AM Reji Abbasi, PT [facility]   8/15/2022  7:15 AM Melinda Claiborne, PT [facility]   8/17/2022  7:15 AM Micheal Orellana, PT [facility]   8/19/2022  7:15 AM Dano Downs, PT [facility]   8/22/2022  7:15 AM Melinda Mccracken, PT [facility]   8/24/2022  8:30 AM Reji Abbasi, PT [facility]   8/29/2022  7:15 AM Melinda Mccracken, PT [facility]   8/31/2022  8:00 AM Jey Butler [facility]

## 2022-07-22 ENCOUNTER — HOSPITAL ENCOUNTER (OUTPATIENT)
Dept: PHYSICAL THERAPY | Age: 67
Discharge: HOME | End: 2022-07-22
Payer: MEDICARE

## 2022-07-22 PROCEDURE — 97140 MANUAL THERAPY 1/> REGIONS: CPT

## 2022-07-22 PROCEDURE — 97110 THERAPEUTIC EXERCISES: CPT

## 2022-07-22 NOTE — PROGRESS NOTES
PHYSICAL THERAPY - DAILY TREATMENT NOTE    Patient Name: Myrtle Eden        Date: 2022  : 1955   YES Patient  Verified  Visit #:   15   of   18  Insurance: Payor: Tamika Peres / Plan: [place] MEDICARE PART A & B / Product Type: Medicare /      In time: 8:00 A Out time: 9:05 A   Total Treatment Time: 65     BCBS/Medicare Time Tracking (below)   Total Timed Codes (min):  55 1:1 Treatment Time:  55     TREATMENT AREA =  Pain in left shoulder [M25.512]    SUBJECTIVE  Pain Level (on 0 to 10 scale):  0  / 10   Medication Changes/New allergies or changes in medical history, any new surgeries or procedures? NO    If yes, update Summary List   Subjective Functional Status/Changes:  []  No changes reported     Patient reports her R arm is feeling much better, she is looking forward to getting out of her sling.            Modalities Rationale:     decrease edema, decrease inflammation and decrease pain to improve patient's ability to return to pain-free ADLs    min [] Estim, type/location:                                      []  att     []  unatt     []  w/US     []  w/ice    []  w/heat    min []  Mechanical Traction: type/lbs                   []  pro   []  sup   []  int   []  cont    []  before manual    []  after manual    min []  Ultrasound, settings/location:      min []  Iontophoresis w/ dexamethasone, location:                                               []  take home patch       []  in clinic   10 min [x]  Ice     []  Heat    location/position: Seated to B shoulders     min []  Vasopneumatic Device, press/temp:    If using vaso (only need to measure limb vaso being performed on)      pre-treatment girth :       post-treatment girth :       measured at (landmark location) :      min []  Other:    [] Skin assessment post-treatment (if applicable):    [x]  intact    [x]  redness- no adverse reaction                  []redness - adverse reaction:        30 min Therapeutic Exercise:  [x]  See flow sheet Rationale:      increase ROM and increase strength to improve the patients ability to return to pain-free  gardening using R UE    25 min Manual Therapy: PROM for L shoulder for flex/scaption, ER@ varying degrees of ABD, STM to R posterior girdle, inf GHJ to mobilizations, oscillations to promote relaxation on L   Rationale:      decrease pain and increase ROM to improve patient's ability to return pain-free elevation once DC use of sling on L  The manual therapy interventions were performed at a separate and distinct time from the therapeutic activities interventions. Billed With/As:   [] TE   [] TA   [] Neuro   [] Self Care Patient Education: [x] Review HEP    [] Progressed/Changed HEP based on:   [] positioning   [] body mechanics   [] transfers   [] heat/ice application    [] other:      Other Objective/Functional Measures:    Progressed theraband on L LE, green band for rows/IR, orange band for ER     Post Treatment Pain Level (on 0 to 10) scale:   0  / 10     ASSESSMENT  Assessment/Changes in Function:     Good tolerance to strengthening progressions today      []  See Progress Note/Recertification   Patient will continue to benefit from skilled PT services to modify and progress therapeutic interventions, address functional mobility deficits, address ROM deficits, address strength deficits, analyze and address soft tissue restrictions, analyze and cue movement patterns, analyze and modify body mechanics/ergonomics, assess and modify postural abnormalities and instruct in home and community integration to attain remaining goals.    Progress toward goals / Updated goals:    Progressing towards LTG 3     PLAN  []  Upgrade activities as tolerated YES Continue plan of care   []  Discharge due to :    []  Other:      Therapist: Paolo Bernard PT    Date: 7/22/2022 Time: 9:05 AM     Future Appointments   Date Time Provider Trev Ragsdale   7/25/2022  8:00 AM Xander Downs, PT [facility] 7/27/2022  8:00 AM Viji Stark A [facility]   7/29/2022  8:00 AM Richardine Primrose, PT [facility]   8/1/2022  7:15 AM Richardine Primrose, PT [facility]   8/3/2022  8:00 AM Budclaue Tarun [facility]   8/5/2022  7:15 AM Richardine Primrose, PT [facility]   8/8/2022  8:30 AM [facility] Salt, [facility] [place] [facility]   8/10/2022  7:15 AM Carmen Rasheed, PT [facility] [place] [facility]   8/10/2022  8:40 AM Laura [facility]abb, NP [facility]   8/12/2022  8:30 AM Hettie Salt, PT [facility]   8/15/2022  7:15 AM Richardine Primrose, PT [facility]   8/17/2022  7:15 AM Carmen Rasheed, PT [facility]   8/19/2022  7:15 AM Richardine Primrose, PT [facility]   8/22/2022  7:15 AM Richardine Primrose, PT [facility]   8/24/2022  8:30 AM Hettie Salt, PT [facility]   8/29/2022  7:15 AM Richardine Primrose, PT [facility]   8/31/2022  8:00 AM Buddie Tarun [facility]

## 2022-07-25 ENCOUNTER — HOSPITAL ENCOUNTER (OUTPATIENT)
Dept: PHYSICAL THERAPY | Age: 67
Discharge: HOME | End: 2022-07-25
Payer: MEDICARE

## 2022-07-25 PROCEDURE — 97140 MANUAL THERAPY 1/> REGIONS: CPT

## 2022-07-25 PROCEDURE — 97110 THERAPEUTIC EXERCISES: CPT

## 2022-07-25 NOTE — PROGRESS NOTES
PHYSICAL THERAPY - DAILY TREATMENT NOTE    Patient Name: Criss Colon        Date: 2022  : 1955   YES Patient  Verified  Visit #:   15      18  Insurance: Payor: Libby Echols / Plan: [place] MEDICARE PART A & B / Product Type: Medicare /      In time: 8 A Out time: 9 A   Total Treatment Time: 60     BCBS/Medicare Time Tracking (below)   Total Timed Codes (min):  50 1:1 Treatment Time:  50     TREATMENT AREA =  Pain in left shoulder [M25.512]    SUBJECTIVE  Pain Level (on 0 to 10 scale):  0  / 10   Medication Changes/New allergies or changes in medical history, any new surgeries or procedures? NO    If yes, update Summary List   Subjective Functional Status/Changes:  []  No changes reported     Patient reports her L shoulder has been feeling good, she has aching in L shoulder if she moves it the wrong way.             Modalities Rationale:     decrease edema, decrease inflammation, and decrease pain to improve patient's ability to return to pain-free ADLs    min [] Estim, type/location:                                      []  att     []  unatt     []  w/US     []  w/ice    []  w/heat    min []  Mechanical Traction: type/lbs                   []  pro   []  sup   []  int   []  cont    []  before manual    []  after manual    min []  Ultrasound, settings/location:      min []  Iontophoresis w/ dexamethasone, location:                                               []  take home patch       []  in clinic   10 min [x]  Ice     []  Heat    location/position: Seated to R/L shoulders     min []  Vasopneumatic Device, press/temp:    If using vaso (only need to measure limb vaso being performed on)      pre-treatment girth :       post-treatment girth :       measured at (landmark location) :      min []  Other:    [] Skin assessment post-treatment (if applicable):    [x]  intact    [x]  redness- no adverse reaction                  []redness - adverse reaction:        25 min Therapeutic Exercise:  [x]  See flow sheet   Rationale:      increase ROM and increase strength to improve the patients ability to return to pain-free reaching overhead with L shoulder     25 min Manual Therapy: PROM for flex/scaption, ER at varying level of ABD in supine   Rationale:      decrease pain and increase ROM to improve patient's ability to return to overhead reaching once DC use of sling   The manual therapy interventions were performed at a separate and distinct time from the therapeutic activities interventions. Billed With/As:   [] TE   [] TA   [] Neuro   [] Self Care Patient Education: [x] Review HEP    [] Progressed/Changed HEP based on:   [] positioning   [] body mechanics   [] transfers   [] heat/ice application    [] other:      Other Objective/Functional Measures:    See flow sheet     Post Treatment Pain Level (on 0 to 10) scale:   0  / 10     ASSESSMENT  Assessment/Changes in Function:     Good tolerance to current program, no increase in R/L shoulder pain      []  See Progress Note/Recertification   Patient will continue to benefit from skilled PT services to modify and progress therapeutic interventions, address functional mobility deficits, address ROM deficits, address strength deficits, analyze and address soft tissue restrictions, analyze and cue movement patterns, analyze and modify body mechanics/ergonomics, assess and modify postural abnormalities, address imbalance/dizziness, and instruct in home and community integration to attain remaining goals.    Progress toward goals / Updated goals:    Progressing towards LTG 2 & 3     PLAN  []  Upgrade activities as tolerated YES Continue plan of care   []  Discharge due to :    []  Other:      Therapist: Vito Petit PT    Date: 7/25/2022 Time: 10:43 AM     Future Appointments   Date Time Provider Trev Ragsdale   7/27/2022  8:00 AM Michael Nielson [facility]   7/29/2022  8:00 AM Brendan Salmon PT [facility]   8/1/2022  7:15 AM Jonn Downs, PT [facility]   8/3/2022  8:00 AM Aggie Perez [facility]   8/5/2022  7:15 AM Yasmin Buck, PT [facility]   8/8/2022  8:30 AM [facility] [place] [facility]   8/10/2022  7:15 AM Luan Gonzalez, PT [facility] [place] [facility]   8/10/2022  8:40 AM Mikala Babb, NP [facility]   8/12/2022  8:30 AM Omar Aguila, PT [facility] [place] [facility]   8/15/2022  7:15 AM Yasmin Buck, PT [facility]   8/17/2022  7:15 AM Luan Gonzalez, [facility] [place] [facility]   8/19/2022  7:15 AM Yasmin Buck, PT [facility]   8/22/2022  7:15 AM Yasmin Buck, PT [facility]   8/24/2022  8:30 AM Omar Aguila, PT [facility]   8/29/2022  7:15 AM Yasmin Buck, PT [facility]   8/31/2022  8:00 AM Aggie Perez [facility]

## 2022-07-27 ENCOUNTER — HOSPITAL ENCOUNTER (OUTPATIENT)
Dept: PHYSICAL THERAPY | Age: 67
Discharge: HOME | End: 2022-07-27
Payer: MEDICARE

## 2022-07-27 PROCEDURE — 97110 THERAPEUTIC EXERCISES: CPT

## 2022-07-27 PROCEDURE — 97140 MANUAL THERAPY 1/> REGIONS: CPT

## 2022-07-27 NOTE — PROGRESS NOTES
PHYSICAL THERAPY - DAILY TREATMENT NOTE    Patient Name: Jarad Mroa        Date: 2022  : 1955   YES Patient  Verified  Visit #:   15   of   20  Insurance: Payor: Nicolás Alt / Plan: [place] MEDICARE PART A & B / Product Type: Medicare /      In time: 106 Out time: 891   Total Treatment Time: 50     BCBS/Medicare Time Tracking (below)   Total Timed Codes (min):  40 1:1 Treatment Time:  40     TREATMENT AREA =  Pain in left shoulder [M25.512]    SUBJECTIVE  Pain Level (on 0 to 10 scale):  0  / 10   Medication Changes/New allergies or changes in medical history, any new surgeries or procedures? NO    If yes, update Summary List   Subjective Functional Status/Changes:  []  No changes reported     Patient reports that her R shoulder was bothreing her sleeping the last few days, but her surgical arm has been feeling good. Stiff in the morning.            Modalities Rationale:     decrease inflammation and decrease pain to improve patient's ability to perform pain free ADLs    min [] Estim, type/location:                                      []  att     []  unatt     []  w/US     []  w/ice    []  w/heat    min []  Mechanical Traction: type/lbs                   []  pro   []  sup   []  int   []  cont    []  before manual    []  after manual    min []  Ultrasound, settings/location:      min []  Iontophoresis w/ dexamethasone, location:                                               []  take home patch       []  in clinic   10 min [x]  Ice     []  Heat    location/position: CP to B shoulders in supine     min []  Vasopneumatic Device, press/temp:    If using vaso (only need to measure limb vaso being performed on)      pre-treatment girth :       post-treatment girth :       measured at (landmark location) :      min []  Other:    [] Skin assessment post-treatment (if applicable):    []  intact    []  redness- no adverse reaction                  []redness - adverse reaction:        15 min Therapeutic Exercise:  [x]  See flow sheet   Rationale:      increase ROM, increase strength, improve coordination, improve balance, and increase proprioception to improve the patients ability to perform unlimited ADLS     25 min Manual Therapy: PROM of the L shoulder into flexion/scaption to about 140 degrees, ER at 30 degrees of abduction all with cues for relaxation throughout. STM and TPR to R teres and infraspinatus in supine followed by manual posterior capsule stretch   Rationale:      decrease pain, increase ROM, and decrease trigger points to improve patient's ability to perform pain free ADLs   The manual therapy interventions were performed at a separate and distinct time from the therapeutic activities interventions. Billed With/As:   [] TE   [] TA   [] Neuro   [] Self Care Patient Education: [x] Review HEP    [] Progressed/Changed HEP based on:   [] positioning   [] body mechanics   [] transfers   [] heat/ice application    [] other:      Other Objective/Functional Measures:    Patient had some stiffness throughout the L shoulder today which did loosen up with manual therapy. Held R shoulder strengthening due to reports of inc pain over the last few days     Post Treatment Pain Level (on 0 to 10) scale:   0  / 10     ASSESSMENT  Assessment/Changes in Function:     Patient is making good progress in terms of PROM of the L shoulder. Plan to progress per bladimir. []  See Progress Note/Recertification   Patient will continue to benefit from skilled PT services to modify and progress therapeutic interventions, address functional mobility deficits, address ROM deficits, address strength deficits, analyze and address soft tissue restrictions, analyze and cue movement patterns, analyze and modify body mechanics/ergonomics, assess and modify postural abnormalities, address imbalance/dizziness, and instruct in home and community integration to attain remaining goals.    Progress toward goals / Updated goals:    Progressing towards LTG 2.       PLAN  []  Upgrade activities as tolerated YES Continue plan of care   []  Discharge due to :    []  Other:      Therapist: Rolan Christian    Date: 7/27/2022 Time: 8:40 AM     Future Appointments   Date Time Provider Trev Ragsdale   7/29/2022  8:00 AM Lamar Masterson, PT [facility]   8/1/2022  7:15 AM Lamar Masterson, PT [facility]   8/3/2022  8:00 AM Carol Larson [facility]   8/5/2022  7:15 AM Lamar Masterson, PT [facility]   8/8/2022  8:30 AM Caron Gomes, PT [facility]   8/10/2022  7:15 AM Shaun Orozco, PT [facility]   8/10/2022  8:40 AM Polizos, Mendel [place], NP [facility]placed [facility]   8/12/2022  8:30 AM Caron Gomes, PT [facility]   8/15/2022  7:15 AM Lamar Masterson, PT [facility]   8/17/2022  7:15 AM Shaun Orozco, PT [facility]   8/19/2022  7:15 AM Aby Downs, PT [facility]   8/22/2022  7:15 AM Lamar Masterson, PT [facility]   8/24/2022  8:30 AM Caron Gomes, PT [facility]   8/29/2022  7:15 AM Lamar Masterson, PT [facility]   8/31/2022  8:00 AM Carol Larson [facility]

## 2022-07-29 ENCOUNTER — HOSPITAL ENCOUNTER (OUTPATIENT)
Dept: PHYSICAL THERAPY | Age: 67
Discharge: HOME | End: 2022-07-29
Payer: MEDICARE

## 2022-07-29 PROCEDURE — 97140 MANUAL THERAPY 1/> REGIONS: CPT

## 2022-07-29 PROCEDURE — 97110 THERAPEUTIC EXERCISES: CPT

## 2022-07-29 NOTE — PROGRESS NOTES
PHYSICAL THERAPY - DAILY TREATMENT NOTE    Patient Name: Иван Enamorado        Date: 2022  : 1955   YES Patient  Verified  Visit #:     Insurance: Payor: Tiara Trent / Plan: [place] MEDICARE PART A & B / Product Type: Medicare /      In time: 7:55 A Out time: 9:00 A   Total Treatment Time: 60     BCBS/Medicare Time Tracking (below)   Total Timed Codes (min):  50 1:1 Treatment Time:  40     TREATMENT AREA =  Pain in left shoulder [M25.512]    SUBJECTIVE  Pain Level (on 0 to 10 scale):  2-3   10   Medication Changes/New allergies or changes in medical history, any new surgeries or procedures? NO    If yes, update Summary List   Subjective Functional Status/Changes:  []  No changes reported     Patient reports she started propping her R shoulder at night which has been helpful. The soreness that she was feeling when she reached overhead is almost gone.            Modalities Rationale:     decrease edema, decrease inflammation, and decrease pain to improve patient's ability to return to pain-free ADLs    min [] Estim, type/location:                                      []  att     []  unatt     []  w/US     []  w/ice    []  w/heat    min []  Mechanical Traction: type/lbs                   []  pro   []  sup   []  int   []  cont    []  before manual    []  after manual    min []  Ultrasound, settings/location:      min []  Iontophoresis w/ dexamethasone, location:                                               []  take home patch       []  in clinic   10 min [x]  Ice     []  Heat    location/position: Seated to R/L shoulder     min []  Vasopneumatic Device, press/temp:    If using vaso (only need to measure limb vaso being performed on)      pre-treatment girth :       post-treatment girth :       measured at (landmark location) :      min []  Other:    [] Skin assessment post-treatment (if applicable):    [x]  intact    [x]  redness- no adverse reaction                  []redness - adverse reaction:        25/  15 min Therapeutic Exercise:  [x]  See flow sheet   Rationale:      increase ROM and increase strength to improve the patients ability to return to pain-free use or R UE with ADLs      25 min Manual Therapy: PROM for flex/scaption, ER to L shoulder, STM/MFR to R   Rationale:      decrease pain, increase ROM, increase tissue extensibility, decrease trigger points, and increase postural awareness to improve patient's ability to turn to pain-free use of R UE with elevation   The manual therapy interventions were performed at a separate and distinct time from the therapeutic activities interventions. Billed With/As:   [] TE   [] TA   [] Neuro   [] Self Care Patient Education: [x] Review HEP    [] Progressed/Changed HEP based on:   [] positioning   [] body mechanics   [] transfers   [] heat/ice application    [] other:      Other Objective/Functional Measures:    See flow sheet      Post Treatment Pain Level (on 0 to 10) scale:   0  / 10     ASSESSMENT  Assessment/Changes in Function:     Good reduction of R UE today with modifications this week     []  See Progress Note/Recertification   Patient will continue to benefit from skilled PT services to modify and progress therapeutic interventions, address functional mobility deficits, address ROM deficits, address strength deficits, analyze and address soft tissue restrictions, analyze and cue movement patterns, analyze and modify body mechanics/ergonomics, assess and modify postural abnormalities, and instruct in home and community integration to attain remaining goals.    Progress toward goals / Updated goals:    Progressing towards LTG 2 & 3     PLAN  []  Upgrade activities as tolerated YES Continue plan of care   []  Discharge due to :    []  Other:      Therapist: Lauren Eason PT    Date: 7/29/2022 Time: 8:05 AM     Future Appointments   Date Time Provider Trev Ragsdale   8/1/2022  7:15 AM Aby Downs, PT [facility] 8/3/2022  8:00 AM Vicki Kaveh [facility]   8/5/2022  7:15 AM Og Lua, PT [facility]   8/8/2022  8:30 AM Brendon Higuera, PT [facility]   8/10/2022  7:15 AM Elier Vinson, PT [facility]   8/12/2022  8:30 AM Brendon Higuera, PT [facility]   8/15/2022  7:15 AM Og Lua, PT [facility]   8/17/2022  7:15 AM Elier Vinson, PT [facility]   8/19/2022  7:15 AM Og Lua, PT [facility]   8/22/2022  7:15 AM Og Lua, PT [facility]   8/24/2022  8:30 AM Brendon Higuera, PT [facility]   8/29/2022  7:15 AM Og Lua, PT [facility]   8/31/2022  8:00 AM Vicki Kaveh [facility]   9/1/2022 11:20 AM Anival Mckenzie, NP [facility]

## 2022-08-01 ENCOUNTER — HOSPITAL ENCOUNTER (OUTPATIENT)
Dept: PHYSICAL THERAPY | Age: 67
Discharge: HOME | End: 2022-08-01
Payer: MEDICARE

## 2022-08-01 PROCEDURE — 97140 MANUAL THERAPY 1/> REGIONS: CPT

## 2022-08-01 PROCEDURE — 97110 THERAPEUTIC EXERCISES: CPT

## 2022-08-01 NOTE — PROGRESS NOTES
PHYSICAL THERAPY - DAILY TREATMENT NOTE    Patient Name: Hilario Khalil        Date: 2022  : 1955   YES Patient  Verified  Visit #:     Insurance: Payor: [place] MEDICARE / Plan: [place] MEDICARE PART A & B / Product Type: Medicare /      In time: 7:15 A Out time: 9:15 A   Total Treatment Time: 55     BCBS/Medicare Time Tracking (below)   Total Timed Codes (min):  45 1:1 Treatment Time:  45     TREATMENT AREA =  Pain in left shoulder [M25.512]    SUBJECTIVE  Pain Level (on 0 to 10 scale):  2-3  / 10   Medication Changes/New allergies or changes in medical history, any new surgeries or procedures? NO    If yes, update Summary List   Subjective Functional Status/Changes:  []  No changes reported     Patient reports no new compliants since last treatment.           Modalities Rationale:     decrease edema, decrease inflammation, and decrease pain to improve patient's ability to return to pain-free ADLs    min [] Estim, type/location:                                      []  att     []  unatt     []  w/US     []  w/ice    []  w/heat    min []  Mechanical Traction: type/lbs                   []  pro   []  sup   []  int   []  cont    []  before manual    []  after manual    min []  Ultrasound, settings/location:      min []  Iontophoresis w/ dexamethasone, location:                                               []  take home patch       []  in clinic   10 min [x]  Ice     []  Heat    location/position: Seated to R/L shoulder     min []  Vasopneumatic Device, press/temp:    If using vaso (only need to measure limb vaso being performed on)      pre-treatment girth :       post-treatment girth :       measured at (landmark location) :      min []  Other:    [] Skin assessment post-treatment (if applicable):    [x]  intact    [x]  redness- no adverse reaction                  []redness - adverse reaction:        25 min Therapeutic Exercise:  [x]  See flow sheet   Rationale:      increase ROM and increase strength to improve the patients ability to return to pain-free use of L UE once DC use of sling     20 min Manual Therapy: PROM for flex/scaption, [email or web address] of ABD, STM/MFR to R supra/infraspinatus, gentle manual posterior capsule release in supine   Rationale:      decrease pain, increase ROM, increase tissue extensibility, decrease trigger points, and increase postural awareness to improve patient's ability to turn to pain-free use of R UE with ADLs  The manual therapy interventions were performed at a separate and distinct time from the therapeutic activities interventions. Billed With/As:   [] TE   [] TA   [] Neuro   [] Self Care Patient Education: [x] Review HEP    [] Progressed/Changed HEP based on:   [] positioning   [] body mechanics   [] transfers   [] heat/ice application    [] other:      Other Objective/Functional Measures:    See flow sheet      Post Treatment Pain Level (on 0 to 10) scale:   0  / 10     ASSESSMENT  Assessment/Changes in Function:     No increase in R/L UE pain with today's treatment      []  See Progress Note/Recertification   Patient will continue to benefit from skilled PT services to modify and progress therapeutic interventions, address functional mobility deficits, address ROM deficits, address strength deficits, analyze and address soft tissue restrictions, analyze and cue movement patterns, analyze and modify body mechanics/ergonomics, assess and modify postural abnormalities, and instruct in home and community integration to attain remaining goals.    Progress toward goals / Updated goals:    Progressing towards LTG 2 & 3     PLAN  []  Upgrade activities as tolerated YES Continue plan of care   []  Discharge due to :    []  Other:      Therapist: Swati Grijalva PT    Date: 8/1/2022 Time: 9:00 AM     Future Appointments   Date Time Provider Trev Ragsdale   8/3/2022  8:00 AM Leonie Goldsmith [facility]   8/5/2022  7:15 AM Breann Downs, PT [facility] 8/8/2022  8:30 AM Reyes Felts, PT [facility]   8/10/2022  7:15 AM Yazanella Sol, PT [facility]   8/12/2022  8:30 AM Reyes Felts, PT [facility]   8/15/2022  7:15 AM Ravi Duenas, PT [facility]   8/17/2022  7:15 AM Vinayak Horton, PT [facility]   8/19/2022  7:15 AM Ravi Duenas, PT [facility]   8/22/2022  7:15 AM Ravi Duenas, PT [facility]   8/24/2022  8:30 AM Reyes Felts, PT [facility]   8/29/2022  7:15 AM Ravi Duenas, PT [facility]   8/31/2022  8:00 AM Yosvany Ferrer [facility]   9/1/2022 11:20 AM Virginia Ansari, NP [facility] [facility]

## 2022-08-03 ENCOUNTER — HOSPITAL ENCOUNTER (OUTPATIENT)
Dept: PHYSICAL THERAPY | Age: 67
Discharge: HOME | End: 2022-08-03
Payer: MEDICARE

## 2022-08-03 PROCEDURE — 97110 THERAPEUTIC EXERCISES: CPT

## 2022-08-03 PROCEDURE — 97140 MANUAL THERAPY 1/> REGIONS: CPT

## 2022-08-03 NOTE — PROGRESS NOTES
PHYSICAL THERAPY - DAILY TREATMENT NOTE    Patient Name: Cristofer Garcia        Date: 8/3/2022  : 1955   YES Patient  Verified  Visit #:   25   of   20  Insurance: Payor: [place] MEDICARE / Plan: VA MEDICARE PART A & B / Product Type: Medicare /      In time: 8:00 A Out time: 8:55 A   Total Treatment Time: 55     BCBS/Medicare Time Tracking (below)   Total Timed Codes (min):  45 1:1 Treatment Time:  45     TREATMENT AREA =  Pain in left shoulder [M25.512]    SUBJECTIVE  Pain Level (on 0 to 10 scale):  0  / 10   Medication Changes/New allergies or changes in medical history, any new surgeries or procedures? NO    If yes, update Summary List   Subjective Functional Status/Changes:  []  No changes reported     Patient reports she lifted a small bag of bird feed with her R shoulder & was pretty painful.            Modalities Rationale:     decrease edema, decrease inflammation, and decrease pain to improve patient's ability to return to pain-free ADLs    min [] Estim, type/location:                                      []  att     []  unatt     []  w/US     []  w/ice    []  w/heat    min []  Mechanical Traction: type/lbs                   []  pro   []  sup   []  int   []  cont    []  before manual    []  after manual    min []  Ultrasound, settings/location:      min []  Iontophoresis w/ dexamethasone, location:                                               []  take home patch       []  in clinic   10 min [x]  Ice     []  Heat    location/position: Seated to R/L shoulder     min []  Vasopneumatic Device, press/temp:    If using vaso (only need to measure limb vaso being performed on)      pre-treatment girth :       post-treatment girth :       measured at (landmark location) :      min []  Other:    [] Skin assessment post-treatment (if applicable):    [x]  intact    [x]  redness- no adverse reaction                  []redness - adverse reaction:        25 min Therapeutic Exercise:  [x]  See flow sheet Rationale:      increase ROM and increase strength to improve the patients ability to return to light lifting with L UE    20 min Manual Therapy: PROM for flex/scaption, [email or web address] of ABD, STM/MFR to R supra/infraspinatus, gentle manual posterior capsule release in supine   Rationale:      decrease pain, increase ROM, increase tissue extensibility, decrease trigger points, and increase postural awareness to improve elevation of L UE once DC use of sling  The manual therapy interventions were performed at a separate and distinct time from the therapeutic activities interventions. Billed With/As:   [] TE   [] TA   [] Neuro   [] Self Care Patient Education: [x] Review HEP    [] Progressed/Changed HEP based on:   [] positioning   [] body mechanics   [] transfers   [] heat/ice application    [] other:      Other Objective/Functional Measures:    See flow sheet      Post Treatment Pain Level (on 0 to 10) scale:   0  / 10     ASSESSMENT  Assessment/Changes in Function:     TTP along R posterior shoulder continues to decrease with manual therapy     []  See Progress Note/Recertification   Patient will continue to benefit from skilled PT services to modify and progress therapeutic interventions, address functional mobility deficits, address ROM deficits, address strength deficits, analyze and address soft tissue restrictions, analyze and cue movement patterns, analyze and modify body mechanics/ergonomics, assess and modify postural abnormalities, and instruct in home and community integration to attain remaining goals.    Progress toward goals / Updated goals:    Progressing towards LTG 2 & 3     PLAN  []  Upgrade activities as tolerated YES Continue plan of care   []  Discharge due to :    [x]  Other: Re-assess for progress note n/v     Therapist: Tavo Marino PT    Date: 8/3/2022 Time: 8:55 AM     Future Appointments   Date Time Provider Trev Ragsdale   8/5/2022  7:15 AM Hernan Downs, PT [facility] 8/10/2022  7:15 AM Fabienne Fisher, PT [facility]   8/12/2022  9:30 AM Fabienne Fisher, PT [facility]   8/15/2022  7:15 AM [facility], PT [facility]   8/17/2022  7:15 AM Fabienne Fisher, PT [facility]   8/19/2022  7:15 AM [facility], PT [facility]   8/22/2022  7:15 AM [facility], PT [facility]   8/24/2022  8:30 AM Jessica Victor, PT [facility]   8/29/2022  7:15 AM [facility], PT [facility]   8/31/2022  8:00 AM Milus Linen [facility]   9/1/2022 11:20 AM Yola Babb, NP [facility]   9/6/2022  8:00 AM [facility], PT [facility]   9/8/2022  8:00 AM [facility], PT [facility]   9/12/2022  8:00 AM [facility], PT [facility]   9/14/2022  8:00 AM Milus Linen [facility]   9/19/2022  8:00 AM [facility], PT [facility]   9/21/2022  8:00 AM Milus Linen [facility]   9/26/2022  8:00 AM [facility], PT [facility]   9/28/2022  8:00 AM Radha Steele [facility]

## 2022-08-05 ENCOUNTER — HOSPITAL ENCOUNTER (OUTPATIENT)
Dept: PHYSICAL THERAPY | Age: 67
Discharge: HOME | End: 2022-08-05
Payer: MEDICARE

## 2022-08-05 PROCEDURE — 97110 THERAPEUTIC EXERCISES: CPT

## 2022-08-05 PROCEDURE — 97140 MANUAL THERAPY 1/> REGIONS: CPT

## 2022-08-05 NOTE — PROGRESS NOTES
[address] [place] [address] PHYSICAL THERAPY AT [address] [place] [address] Elpidio Plass [address], [address] [place] [address], [address]  Phone: [phone]  Fax: [phone]  PROGRESS NOTE  Patient Name: Nathaniel Francisco : 1955   Treatment/Medical Diagnosis: Pain in left shoulder [M25.512]  S/p L RCR, SAD, biceps tenodesis, capsular release & manipulation (DOS 22)   Referral Source: Tanner Cheng MD     Date of Initial Visit: 22 Attended Visits: 19 Missed Visits: 0     SUMMARY OF TREATMENT  Therapeutic exercise including UT stretching, pendulums,  strengthening, postural ed, patient education, HEP instruction, CP, passive stretching. Stacy Huddleston will be 6 weeks post op next week (s/p L RCR, SAD, biceps tenodesis, capsular release & manipulation, DOS 22), she generally reports no c/o pain in L shoulder & continues to be compliant with use of sling & activity restrictions. Please see L shoulder PROM improvements below. She continues to report primary c/o pain in right shoulder, which is intermittent in nature, 7-8/10 at worst typically at night & 0/10 at best.     Goal/Measure of Progress Goal Met? 1. Patient will be I and compliant with a progressive AAROM --> AROM program starting at 6 weeks to allow for ease with ADLs. Status at last Eval: NA Current Status: PROM improvements as above N/A   2. Patient will restore AROM to flexion 0-170 degrees, FXL ER to posterior occiput, FXL IR to L4 to allow for ease with dressing. Status at last Eval: PROM  Flexion 0-135 degrees,  Scaption 0-145 degrees  ABD to 45 degrees  ER to neutral Current Status: PROM  Flexion 0-155 degrees  ER t@ 60 deg of ABD to neutral   progressing   3. Pt will increase strength of the L shoulder to 3/5 in order to allow for ease with light lifting   Status at last Eval: NA, immobilized in sling Current Status: TBA N/A     New Goals to be achieved in __4__  weeks:  1.   Patient will report decreased c/o pain to < or = 3-4/10 in RIGHT/LEFT shoulder to facilitate return to light housework with manageable sx. 2.  Patient will restore AROM to flexion 0-170 degrees, FXL ER to posterior occiput, FXL IR to L4 to allow for ease with dressing   3. Patient will increase FOTO score to >/= 58 in order to allow for ease with reaching shoulder heigh shelves. 4.  Pt will increase strength of the L shoulder to 3/5 in order to allow for ease with light lifting       RECOMMENDATIONS  Patient to be weaned out of sling next week & to progress towards AAROM/AROM next week per protocol. Please indicate any changes to current treatment. If you have any questions/comments please contact us directly at [phone]. Thank you for allowing us to assist in the care of your patient. Therapist Signature: MANPREET Meza, octaviano MDT Date: 5/5/8112   Certification Period:  Reporting Period: 8-05-22 to 11-03-22  7-15-22 to 8-05-22 Time: 7:47 AM     NOTE TO PHYSICIAN:  PLEASE COMPLETE THE ORDERS BELOW AND FAX TO   [facility]: [phone]. If you are unable to process this request in 24 hours please contact our office: [phone].    ___ I have read the above report and request that my patient continue as recommended.   ___ I have read the above report and request that my patient continue therapy with the following changes/special instructions:_________________________________________________________   ___ I have read the above report and request that my patient be discharged from therapy.      Physician Signature:                                                             Date:                                     Time:                                                                       Misael Ortiz MD

## 2022-08-05 NOTE — PROGRESS NOTES
PHYSICAL THERAPY - DAILY TREATMENT NOTE    Patient Name: Asuncion Reid        Date: 2022  : 1955   YES Patient  Verified  Visit #:      Insurance: Payor: Julia Racer / Plan: [place] MEDICARE PART A & B / Product Type: Medicare /      In time: 7:15 A Out time: 8:12 A   Total Treatment Time: 55     BCBS/Medicare Time Tracking (below)   Total Timed Codes (min):  45 1:1 Treatment Time:  45     TREATMENT AREA =  Pain in left shoulder [M25.512]    SUBJECTIVE  Pain Level (on 0 to 10 scale):  0  / 10   Medication Changes/New allergies or changes in medical history, any new surgeries or procedures?     NO    If yes, update Summary List   Subjective Functional Status/Changes:  []  No changes reported     See progress note to MD           Modalities Rationale:     decrease edema, decrease inflammation, and decrease pain to improve patient's ability to turn to pain-free ADLs    min [] Estim, type/location:                                      []  att     []  unatt     []  w/US     []  w/ice    []  w/heat    min []  Mechanical Traction: type/lbs                   []  pro   []  sup   []  int   []  cont    []  before manual    []  after manual    min []  Ultrasound, settings/location:      min []  Iontophoresis w/ dexamethasone, location:                                               []  take home patch       []  in clinic   10 min [x]  Ice     []  Heat    location/position: Seated to R/L shoulder     min []  Vasopneumatic Device, press/temp:    If using vaso (only need to measure limb vaso being performed on)      pre-treatment girth :       post-treatment girth :       measured at (landmark location) :      min []  Other:    [] Skin assessment post-treatment (if applicable):    [x]  intact    [x]  redness- no adverse reaction                  []redness - adverse reaction:        20 min Therapeutic Exercise:  [x]  See flow sheet   Rationale:      increase ROM and increase strength to improve the patients ability to return to pain-free lifting     25 min Manual Therapy: PROM for flex/scaption, [email or web address]@[email or web address] of ABD, STM/MFR to R supra/infraspinatus, gentle manual posterior capsule release in supine   Rationale:      decrease pain and increase ROM to improve patient's ability to return to pain-free use of R/L with elevation   The manual therapy interventions were performed at a separate and distinct time from the therapeutic activities interventions. Billed With/As:   [] TE   [] TA   [] Neuro   [] Self Care Patient Education: [x] Review HEP    [] Progressed/Changed HEP based on:   [] positioning   [] body mechanics   [] transfers   [] heat/ice application    [] other:      Other Objective/Functional Measures:    PROM: see progress note for improvements     Post Treatment Pain Level (on 0 to 10) scale:   0  / 10     ASSESSMENT  Assessment/Changes in Function:     Good progress with PROM of L UE, no change in R UE pain     []  See Progress Note/Recertification   Patient will continue to benefit from skilled PT services to to attain remaining goals.    Progress toward goals / Updated goals:    Progressing towards current LTG s (see progress note)     PLAN  []  Upgrade activities as tolerated YES Continue plan of care   []  Discharge due to :    []  Other:      Therapist: Tyrone Rai PT    Date: 8/5/2022 Time: 8:32 AM     Future Appointments   Date Time Provider Trev Ragsdale   8/10/2022  7:15 AM Ruby Gonzalez PT [facility]   8/12/2022  9:30 AM Ruby Gonzalez PT [facility]   8/15/2022  7:15 AM Dominic Fleming, PT [facility]   8/17/2022  7:15 AM Ruby Gonzalez PT [facility]   8/19/2022  7:15 AM Radha Downs PT [facility]   8/22/2022  7:15 AM Dominic Fleming PT [facility]   8/24/2022  8:30 AM Maximino Phillips, PT [facility]   8/29/2022  7:15 AM Dominic Fleming PT [facility]   8/31/2022  8:00 AM Arnulfo Cuenca [facility]   9/1/2022 11:20 AM Denis Paz, NP [address] [facility] 9/6/2022  8:00 AM [facility]", PT [facility]   9/8/2022  8:00 AM [facility] PT [facility]   9/12/2022  8:00 AM [facility] PT [facility]   9/14/2022  8:00 AM Milus Linen [facility]   9/19/2022  8:00 AM [facility]", PT [facility]   9/21/2022  8:00 AM Milus Linen [facility]   9/26/2022  8:00 AM [facility] PT [facility]   9/28/2022  8:00 AM Milus Linen [facility]

## 2022-08-08 ENCOUNTER — APPOINTMENT (OUTPATIENT)
Dept: PHYSICAL THERAPY | Age: 67
End: 2022-08-08
Payer: MEDICARE

## 2022-08-10 ENCOUNTER — HOSPITAL ENCOUNTER (OUTPATIENT)
Dept: PHYSICAL THERAPY | Age: 67
Discharge: HOME | End: 2022-08-10
Payer: MEDICARE

## 2022-08-10 PROCEDURE — 97140 MANUAL THERAPY 1/> REGIONS: CPT

## 2022-08-10 PROCEDURE — 97110 THERAPEUTIC EXERCISES: CPT

## 2022-08-10 NOTE — PROGRESS NOTES
PHYSICAL THERAPY - DAILY TREATMENT NOTE    Patient Name: Hilario Khalil        Date: 8/10/2022  : 1955   YES Patient  Verified  Visit #:     Insurance: Payor: Roberto Vang / Plan: [place] MEDICARE PART A & B / Product Type: Medicare /      In time: 352 Out time: 810   Total Treatment Time: 55     BCBS/Medicare Time Tracking (below)   Total Timed Codes (min):  45 1:1 Treatment Time:  30     TREATMENT AREA =  Pain in left shoulder [M25.512]    SUBJECTIVE  Pain Level (on 0 to 10 scale):  0  / 10   Medication Changes/New allergies or changes in medical history, any new surgeries or procedures? NO    If yes, update Summary List   Subjective Functional Status/Changes:  []  No changes reported     Dr Caron Jaime is happy with my progress and has cleared me from wearing the sling. I am still sleeping propped up with a lot of pillow support. Modalities Rationale:     decrease edema, decrease inflammation, and decrease pain to improve patient's ability to perform pain-free ADLs.     min [] Estim, type/location:                                      []  att     []  unatt     []  w/US     []  w/ice    []  w/heat    min []  Mechanical Traction: type/lbs                   []  pro   []  sup   []  int   []  cont    []  before manual    []  after manual    min []  Ultrasound, settings/location:      min []  Iontophoresis w/ dexamethasone, location:                                               []  take home patch       []  in clinic   10 min [x]  Ice     []  Heat    location/position: L shoulder supine    min []  Vasopneumatic Device, press/temp:    If using vaso (only need to measure limb vaso being performed on)      pre-treatment girth :       post-treatment girth :       measured at (landmark location) :      min []  Other:    [] Skin assessment post-treatment (if applicable):    []  intact    []  redness- no adverse reaction                  []redness - adverse reaction:        30/  15 min Therapeutic Exercise:  [x]  See flow sheet   Rationale:      increase ROM, increase strength, improve coordination, and increase proprioception to improve the patients ability to perform unlimited ADLs. 15 min Manual Therapy: PROM and stretching into flexion and ER with oscillations to promote muscle relaxation   Rationale:      decrease pain, increase ROM, increase tissue extensibility, and decrease edema  to improve patient's ability to improve ROM per protocol  The manual therapy interventions were performed at a separate and distinct time from the therapeutic activities interventions. Billed With/As:   [x] TE   [] TA   [] Neuro   [] Self Care Patient Education: [x] Review HEP    [] Progressed/Changed HEP based on:   [] positioning   [] body mechanics   [] transfers   [] heat/ice application    [] other:      Other Objective/Functional Measures:    Initiated AAROM per protocol    Some tightness noted into ER motion today; improved with manual and self stretches     Post Treatment Pain Level (on 0 to 10) scale:   0  / 10     ASSESSMENT  Assessment/Changes in Function:     Good tolerance to initiation of AAROM today although pt required cues 100% of the time to ensure appropriate form. UT compensations were noted and pt had some reports of anterior shoulder pain with inc muscle guarding. []  See Progress Note/Recertification   Patient will continue to benefit from skilled PT services to modify and progress therapeutic interventions, address functional mobility deficits, address ROM deficits, address strength deficits, analyze and address soft tissue restrictions, analyze and cue movement patterns, analyze and modify body mechanics/ergonomics, and assess and modify postural abnormalities to attain remaining goals. Progress toward goals / Updated goals:    Progressing towards LTG 3.       PLAN  [x]  Upgrade activities as tolerated YES Continue plan of care   []  Discharge due to :    []  Other:      Therapist: Olivier Sharma DPT    Date: 8/10/2022 Time: 7:20 AM     Future Appointments   Date Time Provider Trev Ragsdale   8/12/2022  9:30 AM Luan Gonzalez, PT [facility]   8/15/2022  7:15 AM Yasmin Mixer, PT [facility]   8/17/2022  7:15 AM Luan Gonzalez, PT [facility]   8/19/2022  7:15 AM Yasmin Mixer, PT [facility]   8/22/2022  7:15 AM Yasmin Mixer, PT [facility]   8/24/2022  8:30 AM Omar Aguila, PT [facility]   8/29/2022  7:15 AM Yasmin Mixer, PT [facility]   8/31/2022  8:00 AM Aggie Chris [facility]   9/1/2022 11:20 AM Holley, [address] [place] [address], NP [facility]   9/6/2022  8:00 AM Yasmin Mixer, PT [facility]   9/8/2022  8:00 AM Yasmin Mixer, PT [facility]   9/12/2022  8:00 AM Yasmin Mixer, PT [facility]   9/14/2022  8:00 AM Aggie Chris [facility]   9/19/2022  8:00 AM Yasmin Mixer, PT [facility]   9/21/2022  8:00 AM Aggie Chris [facility]   9/26/2022  8:00 AM Yasmin Buck PT [facility]   9/28/2022  8:00 AM Aggie Perez [facility]

## 2022-08-12 ENCOUNTER — HOSPITAL ENCOUNTER (OUTPATIENT)
Dept: PHYSICAL THERAPY | Age: 67
Discharge: HOME | End: 2022-08-12
Payer: MEDICARE

## 2022-08-12 PROCEDURE — 97140 MANUAL THERAPY 1/> REGIONS: CPT

## 2022-08-12 PROCEDURE — 97110 THERAPEUTIC EXERCISES: CPT

## 2022-08-15 ENCOUNTER — APPOINTMENT (OUTPATIENT)
Dept: PHYSICAL THERAPY | Age: 67
End: 2022-08-15
Payer: MEDICARE

## 2022-08-17 ENCOUNTER — HOSPITAL ENCOUNTER (OUTPATIENT)
Dept: PHYSICAL THERAPY | Age: 67
Discharge: HOME | End: 2022-08-17
Payer: MEDICARE

## 2022-08-17 PROCEDURE — 97140 MANUAL THERAPY 1/> REGIONS: CPT

## 2022-08-17 PROCEDURE — 97110 THERAPEUTIC EXERCISES: CPT

## 2022-08-17 NOTE — PROGRESS NOTES
PHYSICAL THERAPY - DAILY TREATMENT NOTE    Patient Name: Brittney Christopher        Date: 2022  : 1955   YES Patient  Verified  Visit #:     Insurance: Payor: William Maceiver / Plan: [place] MEDICARE PART A & B / Product Type: Medicare /      In time: 851 Out time: 815   Total Treatment Time: 55     BCBS/Medicare Time Tracking (below)   Total Timed Codes (min):  45 1:1 Treatment Time:  45     TREATMENT AREA =  Pain in left shoulder [M25.512]    SUBJECTIVE  Pain Level (on 0 to 10 scale):  0  / 10   Medication Changes/New allergies or changes in medical history, any new surgeries or procedures? NO    If yes, update Summary List   Subjective Functional Status/Changes:  []  No changes reported     I am having some popping in the mornings before I stretch. They are not painful. Modalities Rationale:     decrease edema, decrease inflammation, and decrease pain to improve patient's ability to perform pain-free ADLs.     min [] Estim, type/location:                                      []  att     []  unatt     []  w/US     []  w/ice    []  w/heat    min []  Mechanical Traction: type/lbs                   []  pro   []  sup   []  int   []  cont    []  before manual    []  after manual    min []  Ultrasound, settings/location:      min []  Iontophoresis w/ dexamethasone, location:                                               []  take home patch       []  in clinic   10 min [x]  Ice     []  Heat    location/position: L shoulder supine    min []  Vasopneumatic Device, press/temp:    If using vaso (only need to measure limb vaso being performed on)      pre-treatment girth :       post-treatment girth :       measured at (landmark location) :      min []  Other:    [] Skin assessment post-treatment (if applicable):    []  intact    []  redness- no adverse reaction                  []redness - adverse reaction:        30 min Therapeutic Exercise:  [x]  See flow sheet   Rationale:      increase ROM, increase strength, improve coordination, and increase proprioception to improve the patients ability to perform unlimited ADLs. 15 min Manual Therapy: PROM and stretching of L shoulder into flexion and ER with oscillations to promote muscle relaxation  STM/MFR R shoulder RC musculature   Rationale:      decrease pain, increase ROM, increase tissue extensibility, and decrease edema  to improve patient's ability to improve ROM per protocol  The manual therapy interventions were performed at a separate and distinct time from the therapeutic activities interventions. Billed With/As:   [x] TE   [] TA   [] Neuro   [] Self Care Patient Education: [x] Review HEP    [] Progressed/Changed HEP based on:   [] positioning   [] body mechanics   [] transfers   [] heat/ice application    [] other:      Other Objective/Functional Measures: Added finger ladder and UBE    PROM today: 140 deg flex, 40 deg ER at 45 deg ABD, 70 deg ABD with scapula stabilized     Post Treatment Pain Level (on 0 to 10) scale:   0  / 10     ASSESSMENT  Assessment/Changes in Function:     Good tolerance to progression of exercises today with non-painful stretching/tension felt at end range of PROM and AAROM exercises. []  See Progress Note/Recertification   Patient will continue to benefit from skilled PT services to modify and progress therapeutic interventions, address functional mobility deficits, address ROM deficits, address strength deficits, analyze and address soft tissue restrictions, analyze and cue movement patterns, analyze and modify body mechanics/ergonomics, and assess and modify postural abnormalities to attain remaining goals. Progress toward goals / Updated goals:    Progressing towards LTG 3.       PLAN  [x]  Upgrade activities as tolerated YES Continue plan of care   []  Discharge due to :    []  Other:      Therapist: Josse Harris DPT    Date: 8/17/2022 Time: 7:20 AM     Future Appointments   Date Time Provider Trev Ragsdale   8/17/2022  7:15 AM Veronica Root, PT [facility] [place] [facility]   8/19/2022  7:15 AM Diego Arciniega, PT [facility]   8/22/2022  7:15 AM Diego Arciniega, PT [facility]   8/24/2022  8:30 AM [facility]   8/29/2022  7:15 AM Diego Arciniega, PT [facility]   8/31/2022  8:00 AM Angeline Juan Daniel [facility]   9/1/2022 11:20 AM Holley, [address] [place] [address], NP [facility]   9/6/2022  8:00 AM Diego Arciniega, PT [facility]   9/8/2022  8:00 AM Diego Arciniega, PT [facility]   9/12/2022  8:00 AM Diego Arciniega, PT [facility]   9/14/2022  8:00 AM Angeline Juan Daniel [facility]   9/19/2022  8:00 AM Diego Arciniega, PT [facility]   9/21/2022  8:00 AM Angeline Juan Daniel [facility]   9/26/2022  8:00 AM Diego Arciniega, PT [facility]   9/28/2022  8:00 AM Angeline Juan Daniel [facility]

## 2022-08-17 NOTE — PROGRESS NOTES
PHYSICAL THERAPY - DAILY TREATMENT NOTE    Patient Name: Asuncion Reid        Date: 2022  : 1955   YES Patient  Verified  Visit #:     Insurance: Payor: Julia Racer / Plan: [place] MEDICARE PART A & B / Product Type: Medicare /      In time: 122 Out time: 815   Total Treatment Time: 55     BCBS/Medicare Time Tracking (below)   Total Timed Codes (min):  45 1:1 Treatment Time:  35     TREATMENT AREA =  Pain in left shoulder [M25.512]    SUBJECTIVE  Pain Level (on 0 to 10 scale):  0  / 10   Medication Changes/New allergies or changes in medical history, any new surgeries or procedures? NO    If yes, update Summary List   Subjective Functional Status/Changes:  []  No changes reported     I am having some popping in the mornings before I stretch. They are not painful. Modalities Rationale:     decrease edema, decrease inflammation, and decrease pain to improve patient's ability to perform pain-free ADLs.     min [] Estim, type/location:                                      []  att     []  unatt     []  w/US     []  w/ice    []  w/heat    min []  Mechanical Traction: type/lbs                   []  pro   []  sup   []  int   []  cont    []  before manual    []  after manual    min []  Ultrasound, settings/location:      min []  Iontophoresis w/ dexamethasone, location:                                               []  take home patch       []  in clinic   10 min [x]  Ice     []  Heat    location/position: L shoulder supine    min []  Vasopneumatic Device, press/temp:    If using vaso (only need to measure limb vaso being performed on)      pre-treatment girth :       post-treatment girth :       measured at (landmark location) :      min []  Other:    [] Skin assessment post-treatment (if applicable):    []  intact    []  redness- no adverse reaction                  []redness - adverse reaction:        30/  20 min Therapeutic Exercise:  [x]  See flow sheet   Rationale:      increase ROM, increase strength, improve coordination, and increase proprioception to improve the patients ability to perform unlimited ADLs. 15 min Manual Therapy: PROM and stretching of L shoulder into flexion and ER with oscillations to promote muscle relaxation  STM/MFR R shoulder RC musculature   Rationale:      decrease pain, increase ROM, increase tissue extensibility, and decrease edema  to improve patient's ability to improve ROM per protocol  The manual therapy interventions were performed at a separate and distinct time from the therapeutic activities interventions. Billed With/As:   [x] TE   [] TA   [] Neuro   [] Self Care Patient Education: [x] Review HEP    [] Progressed/Changed HEP based on:   [] positioning   [] body mechanics   [] transfers   [] heat/ice application    [] other:      Other Objective/Functional Measures: Added finger ladder and UBE    PROM today: 140 deg flex, 40 deg ER at 45 deg ABD, 70 deg ABD with scapula stabilized     Post Treatment Pain Level (on 0 to 10) scale:   0  / 10     ASSESSMENT  Assessment/Changes in Function:     Good tolerance to progression of exercises today with non-painful stretching/tension felt at end range of PROM and AAROM exercises. []  See Progress Note/Recertification   Patient will continue to benefit from skilled PT services to modify and progress therapeutic interventions, address functional mobility deficits, address ROM deficits, address strength deficits, analyze and address soft tissue restrictions, analyze and cue movement patterns, analyze and modify body mechanics/ergonomics, and assess and modify postural abnormalities to attain remaining goals. Progress toward goals / Updated goals:    Progressing towards LTG 3.       PLAN  [x]  Upgrade activities as tolerated YES Continue plan of care   []  Discharge due to :    []  Other:      Therapist: Bobby Cedeno DPT    Date: 8/17/2022 Time: 7:20 AM     Future Appointments   Date Time Provider Trev Melyssa   8/19/2022  7:15 AM Ledora Ace, PT [facility] [address]   8/22/2022  7:15 AM Ledora Ace, PT [facility] [address]   8/24/2022  8:30 AM Herminia Chani, PT [facility] [place] [address]   8/29/2022  7:15 AM Ledora Ace, PT [facility] [address]   8/31/2022  8:00 AM Saint Comment [facility] [address]   9/1/2022 11:20 AM Holley, [address], NP [facility] SCHED   9/6/2022  8:00 AM Ledora Ace, PT [facility] [address]   9/8/2022  8:00 AM Ledora Ace, PT [facility] [address]   9/12/2022  8:00 AM Ledora Ace, PT [facility] [address]   9/14/2022  8:00 AM Saint Comment [facility] [address]   9/19/2022  8:00 AM Ledora Ace, PT [facility] [address]   9/21/2022  8:00 AM Saint Comment [facility] [address]   9/26/2022  8:00 AM Ledora Ace, PT [facility] [address]   9/28/2022  8:00 AM Saint Comment [facility] [address]

## 2022-08-19 ENCOUNTER — HOSPITAL ENCOUNTER (OUTPATIENT)
Dept: PHYSICAL THERAPY | Age: 67
Discharge: HOME | End: 2022-08-19
Payer: MEDICARE

## 2022-08-19 PROCEDURE — 97530 THERAPEUTIC ACTIVITIES: CPT

## 2022-08-19 PROCEDURE — 97140 MANUAL THERAPY 1/> REGIONS: CPT

## 2022-08-19 PROCEDURE — 97110 THERAPEUTIC EXERCISES: CPT

## 2022-08-19 NOTE — PROGRESS NOTES
PHYSICAL THERAPY - DAILY TREATMENT NOTE    Patient Name: Stacey Turcios        Date: 2022  : 1955   YES Patient  Verified  Visit #:     Insurance: Payor: [place] MEDICARE / Plan: [place] MEDICARE PART A & B / Product Type: Medicare /      In time: 7:15 A Out time: 8:15 A   Total Treatment Time: 55     BCBS/Medicare Time Tracking (below)   Total Timed Codes (min):  45 1:1 Treatment Time:  45     TREATMENT AREA =  Pain in left shoulder [M25.512]    SUBJECTIVE  Pain Level (on 0 to 10 scale):  0  / 10   Medication Changes/New allergies or changes in medical history, any new surgeries or procedures? NO    If yes, update Summary List   Subjective Functional Status/Changes:  []  No changes reported     Patient reports she has been doing well, she was able to wash her hair leaning forward keeping her hands close to her sides.             Modalities Rationale:     decrease edema, decrease inflammation, and decrease pain to improve patient's ability to turn to pain-free use of L/R UE with ADLs    min [] Estim, type/location:                                      []  att     []  unatt     []  w/US     []  w/ice    []  w/heat    min []  Mechanical Traction: type/lbs                   []  pro   []  sup   []  int   []  cont    []  before manual    []  after manual    min []  Ultrasound, settings/location:      min []  Iontophoresis w/ dexamethasone, location:                                               []  take home patch       []  in clinic   10 min [x]  Ice     []  Heat    location/position: Seated to R/L shoulders     min []  Vasopneumatic Device, press/temp:    If using vaso (only need to measure limb vaso being performed on)      pre-treatment girth :       post-treatment girth :       measured at (landmark location) :      min []  Other:    [] Skin assessment post-treatment (if applicable):    [x]  intact    [x]  redness- no adverse reaction                  []redness - adverse reaction:        20 min Therapeutic Exercise:  [x]  See flow sheet   Rationale:      increase ROM and increase strength to improve the patients ability to return to pain-free lifting      10 min Therapeutic Activity: [x]  See flow sheet   Rationale:    increase ROM and increase strength to improve the patients ability to return to dressing & grooming without assist from her     15 min Manual Therapy: Gentle PROM/manual stretch for L shoulder flex/scaption, [email or web address] of ABD   Rationale:      decrease pain and increase ROM to improve patient's ability to return to pain-free elevation using L UE  The manual therapy interventions were performed at a separate and distinct time from the therapeutic activities interventions. Billed With/As:   [] TE   [] TA   [] Neuro   [] Self Care Patient Education: [x] Review HEP    [] Progressed/Changed HEP based on:   [] positioning   [] body mechanics   [] transfers   [] heat/ice application    [] other:      Other Objective/Functional Measures: Added supine hammock stretch, S/L ER & ABD with 1# on R today     Post Treatment Pain Level (on 0 to 10) scale:   0  / 10     ASSESSMENT  Assessment/Changes in Function:     Good tolerance to progression of capuslar streteches & strengthening today     []  See Progress Note/Recertification   Patient will continue to benefit from skilled PT services to modify and progress therapeutic interventions, address functional mobility deficits, address ROM deficits, address strength deficits, analyze and address soft tissue restrictions, analyze and cue movement patterns, analyze and modify body mechanics/ergonomics, assess and modify postural abnormalities, and instruct in home and community integration to attain remaining goals.    Progress toward goals / Updated goals:    Progressing towards LTG 1 & 2     PLAN  []  Upgrade activities as tolerated YES Continue plan of care   []  Discharge due to :    []  Other:      Therapist: Socrates Taylor PT    Date: 8/19/2022 Time: 11:42 AM     Future Appointments   Date Time Provider Trev Ragsdale   8/22/2022  7:15 AM Axel Sepulveda, PT [facility]   8/24/2022  8:30 AM Tonja Hernandez, PT [facility]   8/29/2022  7:15 AM Axel Sepulveda, PT [facility]   8/31/2022  8:00 AM Yokasta Guerra [facility]   9/1/2022 11:20 AM Boy Babb, NP [facility]   9/6/2022  8:00 AM Axel Coco, PT [facility]   9/8/2022  8:00 AM Axel Coco, PT [facility]   9/12/2022  8:00 AM Axel Sepulveda, PT [facility]   9/14/2022  8:00 AM Yokasta Guerra [facility]   9/19/2022  8:00 AM Axel Sepulveda, PT [facility]   9/21/2022  8:00 AM Yokasta Guerra [facility]   9/26/2022  8:00 AM Axel Sepulveda, PT [facility]   9/28/2022  8:00 AM Yokasta Guerra [facility]

## 2022-08-22 ENCOUNTER — HOSPITAL ENCOUNTER (OUTPATIENT)
Dept: PHYSICAL THERAPY | Age: 67
Discharge: HOME | End: 2022-08-22
Payer: MEDICARE

## 2022-08-22 PROCEDURE — 97530 THERAPEUTIC ACTIVITIES: CPT

## 2022-08-22 PROCEDURE — 97110 THERAPEUTIC EXERCISES: CPT

## 2022-08-22 PROCEDURE — 97140 MANUAL THERAPY 1/> REGIONS: CPT

## 2022-08-22 NOTE — PROGRESS NOTES
PHYSICAL THERAPY - DAILY TREATMENT NOTE    Patient Name: Asuncion Reid        Date: 2022  : 1955   YES Patient  Verified  Visit #:     Insurance: Payor: Julia Racer / Plan: [place] MEDICARE PART A & B / Product Type: Medicare /      In time: 7:20 A Out time: 8:20 A   Total Treatment Time: 55     BCBS/Medicare Time Tracking (below)   Total Timed Codes (min):  45 1:1 Treatment Time:  45     TREATMENT AREA =  Pain in left shoulder [M25.512]    SUBJECTIVE  Pain Level (on 0 to 10 scale):  0  / 10   Medication Changes/New allergies or changes in medical history, any new surgeries or procedures? NO    If yes, update Summary List   Subjective Functional Status/Changes:  []  No changes reported     Patient reports she noticed some swelling & warmth in her L arm after last treatment but she has not been having any pain.             Modalities Rationale:     decrease edema, decrease inflammation, and decrease pain to improve patient's ability to return to pain-free ADLs    min [] Estim, type/location:                                      []  att     []  unatt     []  w/US     []  w/ice    []  w/heat    min []  Mechanical Traction: type/lbs                   []  pro   []  sup   []  int   []  cont    []  before manual    []  after manual    min []  Ultrasound, settings/location:      min []  Iontophoresis w/ dexamethasone, location:                                               []  take home patch       []  in clinic   10 min [x]  Ice     []  Heat    location/position: Seated to R/L shoulders     min []  Vasopneumatic Device, press/temp:    If using vaso (only need to measure limb vaso being performed on)      pre-treatment girth :       post-treatment girth :       measured at (landmark location) :      min []  Other:    [] Skin assessment post-treatment (if applicable):    [x]  intact    [x]  redness- no adverse reaction                  []redness - adverse reaction:        20 min Therapeutic Exercise:  [x]  See flow sheet   Rationale:      increase ROM and increase strength to improve the patients ability to return to pain-free lifting using R UE    10 min Therapeutic Activity: [x]  See flow sheet   Rationale:    increase ROM and increase strength to improve the patients ability to return to sleeping in L S/L position at night    15 min Manual Therapy: Gentle PROM/manual stretching for L shoulder flexion/scaption, [email or web address] of ABD in supine, oscillations   Rationale:      decrease pain, increase ROM, and increase postural awareness to improve patient's ability to return to pain-free elevation  The manual therapy interventions were performed at a separate and distinct time from the therapeutic activities interventions. Billed With/As:   [] TE   [] TA   [] Neuro   [] Self Care Patient Education: [x] Review HEP    [] Progressed/Changed HEP based on:   [] positioning   [] body mechanics   [] transfers   [] heat/ice application    [] other:      Other Objective/Functional Measures:    Mild signs of swelling noted along L deltoid, mild warmth to palpation (patient to hold hammock stretch at home), no increase in pain with sleeper stretch     Post Treatment Pain Level (on 0 to 10) scale:   0  / 10     ASSESSMENT  Assessment/Changes in Function:     No increase in L shoulder pain with today's treatment, discussed use of CP, to monitor L shoulder swelling/edema      []  See Progress Note/Recertification   Patient will continue to benefit from skilled PT services to modify and progress therapeutic interventions, address functional mobility deficits, address ROM deficits, address strength deficits, analyze and address soft tissue restrictions, analyze and cue movement patterns, analyze and modify body mechanics/ergonomics, assess and modify postural abnormalities, and instruct in home and community integration to attain remaining goals.    Progress toward goals / Updated goals:    Progressing towards LTG 2 PLAN  []  Upgrade activities as tolerated YES Continue plan of care   []  Discharge due to :    []  Other:      Therapist: Sari Jenkins PT    Date: 8/22/2022 Time: 8:32 AM     Future Appointments   Date Time Provider Trev Ragsdale   8/24/2022  8:30 AM Balwinder Becker, RAJ[facility] [place] [facility]   8/29/2022  7:15 AM Viral Molina, PT [facility]   8/31/2022  8:00 AM Erika Pump [facility]   9/1/2022 11:20 AM Maribel Babb, NP [facility]   9/6/2022  8:00 AM Viral Molina, PT [facility]   9/8/2022  8:00 AM Viral Molina, PT [facility]   9/12/2022  8:00 AM Viral Molina, PT [facility]   9/14/2022  8:00 AM Erika Pump [facility]   9/19/2022  8:00 AM Viral Molina, PT [facility]   9/21/2022  8:00 AM Erika Pump [facility]   9/26/2022  8:00 AM Viral Molina, PT [facility]   9/28/2022  8:00 AM Erika Pump [facility]

## 2022-08-24 ENCOUNTER — HOSPITAL ENCOUNTER (OUTPATIENT)
Dept: PHYSICAL THERAPY | Age: 67
Discharge: HOME | End: 2022-08-24
Payer: MEDICARE

## 2022-08-24 PROCEDURE — 97110 THERAPEUTIC EXERCISES: CPT

## 2022-08-24 PROCEDURE — 97530 THERAPEUTIC ACTIVITIES: CPT

## 2022-08-24 PROCEDURE — 97140 MANUAL THERAPY 1/> REGIONS: CPT

## 2022-08-24 NOTE — PROGRESS NOTES
PHYSICAL THERAPY - DAILY TREATMENT NOTE     Patient Name: Julissa Sykes        Date: 2022  : 1955   YES Patient  Verified  Visit #:     Insurance: Payor: [address] [place] [address] / Plan: [place] MEDICARE PART A & B / Product Type: Medicare /      In time: 131 Out time: 925   Total Treatment Time: 55     Medicare/BCBS Time Tracking (below)   Total Timed Codes (min):  45 1:1 Treatment Time:  45     TREATMENT AREA =  Pain in left shoulder [M25.512]    SUBJECTIVE    Pain Level (on 0 to 10 scale):  0  / 10   Medication Changes/New allergies or changes in medical history, any new surgeries or procedures? NO    If yes, update Summary List   Subjective Functional Status/Changes:  []  No changes reported       Functional improvements: Patient states her arm was still warm, red and swollen until Monday evening. Improved the next day but continued to have swelling after she exercises. No warmth or redness at start of session but continued lateral swelling noted.   Functional impairments: Decreased ROM and strength affecting all ADL's         OBJECTIVE  Modalities Rationale:     decrease edema, decrease inflammation, and decrease pain to improve patient's ability to perform ADL's w/o pain and decreased edema      min [] Estim, type/location:                                      []  att     []  unatt     []  w/US     []  w/ice    []  w/heat    min []  Mechanical Traction: type/lbs                   []  pro   []  sup   []  int   []  cont    []  before manual    []  after manual    min []  Ultrasound, settings/location:      min []  Iontophoresis w/ dexamethasone, location:                                               []  take home patch       []  in clinic   10 min [x]  Ice     []  Heat    location/position: Supine to left shoulder following treatment session    min []  Vasopneumatic Device, press/temp:  If using vaso (only need to measure limb vaso being performed on)      pre-treatment girth : post-treatment girth :       measured at (landmark location) :       min []  Other:    [x] Skin assessment post-treatment (if applicable):    [x]  intact    [x]  redness- no adverse reaction     []redness - adverse reaction:    15 min Therapeutic Exercise:  [x]  See flow sheet   Rationale:      increase ROM and increase strength to improve the patients ability to return to pain-free lifting using R UE     15 min Therapeutic Activity: [x]  See flow sheet   Rationale:    increase ROM and increase strength to improve the patients ability to return to sleeping in L S/L position at night and assist with dressing tasks     15 min Manual Therapy: Gentle PROM/manual stretching for L shoulder flexion/scaption, [email or web address] of ABD in supine, oscillations to promote relaxation   Rationale:      decrease pain, increase ROM, and increase postural awareness to improve patient's ability to return to pain-free elevation  The manual therapy interventions were performed at a separate and distinct time from the therapeutic activities interventions. Billed With/As:   [x] TE   [x] TA   [] Neuro   [] Self Care Patient Education: [x] Review HEP    [] Progressed/Changed HEP based on:   [] positioning   [] body mechanics   [] transfers   [] heat/ice application    [] other:        Other Objective/Functional Measures:    See flow sheet     Post Treatment Pain Level (on 0 to 10) scale:   0  / 10     ASSESSMENT    Assessment/Changes in Function:     Continues to make progress in available ROM w/o increased pain. Continued lateral swelling and mild warmth following therex/activity; no notable redness and no change in swelling from start of treatment session.      []  See Progress Note/Recertification   Patient will continue to benefit from skilled PT services to modify and progress therapeutic interventions, address functional mobility deficits, address ROM deficits, address strength deficits, analyze and address soft tissue restrictions, analyze and cue movement patterns, analyze and modify body mechanics/ergonomics, and assess and modify postural abnormalities to attain remaining goals.       Progress toward goals / Updated goals:    Progressing towards all LTG's     PLAN    [x]  Upgrade activities as tolerated YES Continue plan of care   []  Discharge due to :    []  Other:      Therapist: Peri Schaffer PT    Date: 8/24/2022 Time: 8:03 AM     Future Appointments   Date Time Provider Trev Ragsdale   8/24/2022  8:30 AM Omar Aguila, PT [facility]   8/29/2022  7:15 AM Yasminjennifer Buck, PT [facility]   8/31/2022  8:00 AM Aggie Chris [facility]   9/1/2022 11:20 AM Mikala Babb, NP [facility]   9/6/2022  8:00 AM Yasmin Mixer, PT [facility]   9/8/2022  8:00 AM Yasmin Mixer, PT [facility]   9/12/2022  8:00 AM Yasmin Mixer, PT [facility]   9/14/2022  8:00 AM Aggie Chris [facility]   9/19/2022  8:00 AM Yasmin Mixer, PT [facility]   9/21/2022  8:00 AM Aggie Chris [facility]   9/26/2022  8:00 AM Yasmin Mixer, PT [facility]   9/28/2022  8:00 AM Aggie Chris [facility]

## 2022-08-29 ENCOUNTER — HOSPITAL ENCOUNTER (OUTPATIENT)
Dept: PHYSICAL THERAPY | Age: 67
Discharge: HOME | End: 2022-08-29
Payer: MEDICARE

## 2022-08-29 PROCEDURE — 97140 MANUAL THERAPY 1/> REGIONS: CPT

## 2022-08-29 PROCEDURE — 97530 THERAPEUTIC ACTIVITIES: CPT

## 2022-08-29 PROCEDURE — 97110 THERAPEUTIC EXERCISES: CPT

## 2022-08-29 NOTE — PROGRESS NOTES
PHYSICAL THERAPY - DAILY TREATMENT NOTE    Patient Name: Jenna Eid        Date: 2022  : 1955   YES Patient  Verified  Visit #:      30  Insurance: Payor: Roger Arellano / Plan: [place] MEDICARE PART A & B / Product Type: Medicare /      In time: 7:15 A Out time: 8:15 A   Total Treatment Time: 55     BCBS/Medicare Time Tracking (below)   Total Timed Codes (min):  45 1:1 Treatment Time:  45     TREATMENT AREA =  Pain in left shoulder [M25.512]    SUBJECTIVE  Pain Level (on 0 to 10 scale):  0  / 10   Medication Changes/New allergies or changes in medical history, any new surgeries or procedures? NO    If yes, update Summary List   Subjective Functional Status/Changes:  []  No changes reported     Patient reports the swelling on the outside of her arm is about the same but it is not painful. She is now able to lay on both sides & she is able to tolerate laying on her L side for longer periods of time, R shoulder pain has also improved & is less achy. .            Modalities Rationale:     decrease edema, decrease inflammation, and decrease pain to improve patient's ability to return to pain-free use of L shoulder with ADLs   min [] Estim, type/location:                                      []  att     []  unatt     []  w/US     []  w/ice    []  w/heat    min []  Mechanical Traction: type/lbs                   []  pro   []  sup   []  int   []  cont    []  before manual    []  after manual    min []  Ultrasound, settings/location:      min []  Iontophoresis w/ dexamethasone, location:                                               []  take home patch       []  in clinic   10 min [x]  Ice     []  Heat    location/position: Seated to B shoulders     min []  Vasopneumatic Device, press/temp:    If using vaso (only need to measure limb vaso being performed on)      pre-treatment girth :       post-treatment girth :       measured at (landmark location) :      min []  Other:    [] Skin assessment post-treatment (if applicable):    [x]  intact    [x]  redness- no adverse reaction                  []redness - adverse reaction:        15 min Therapeutic Exercise:  [x]  See flow sheet   Rationale:      increase ROM and increase strength to improve the patients ability to return to light lifting     15 min Therapeutic Activity: [x]  See flow sheet   Rationale:    increase ROM, increase strength, and increase proprioception to improve the patients ability to return to sleeping in L S/L position at night    15 min Manual Therapy: PROM for L shoulder flex/scaption, [email or web address] of ABD in supine   Rationale:      decrease pain and increase ROM to improve patient's ability to return to reaching overhead  The manual therapy interventions were performed at a separate and distinct time from the therapeutic activities interventions. Billed With/As:   [] TE   [] TA   [] Neuro   [] Self Care Patient Education: [x] Review HEP    [] Progressed/Changed HEP based on:   [] positioning   [] body mechanics   [] transfers   [] heat/ice application    [] other:      Other Objective/Functional Measures: Added S/L ER & ABD on L with 0#     Post Treatment Pain Level (on 0 to 10) scale:   0  / 10     ASSESSMENT  Assessment/Changes in Function:     Good tolerance to all progressions, no increase in pain or swelling     []  See Progress Note/Recertification   Patient will continue to benefit from skilled PT services to modify and progress therapeutic interventions, address functional mobility deficits, address ROM deficits, address strength deficits, analyze and address soft tissue restrictions, analyze and cue movement patterns, analyze and modify body mechanics/ergonomics, assess and modify postural abnormalities, address imbalance/dizziness, and instruct in home and community integration to attain remaining goals.    Progress toward goals / Updated goals:    Progressing towards LTG 1 & 2     PLAN  []  Upgrade activities as tolerated YES Continue plan of care   []  Discharge due to :    []  Other:      Therapist: Nelson Masterson PT    Date: 8/29/2022 Time: 10:15 AM     Future Appointments   Date Time Provider Trev Ragsdale   8/31/2022  8:00 AM Gleterrence Marroquinisker [facility]   9/1/2022 11:20 AM Polizos, ΣΑΡΑΝΤΙ, NP [facility]   9/6/2022  8:00 AM Isaac Langford, PT [facility]   9/8/2022  8:00 AM Isaac Langford, PT [facility]   9/12/2022  8:00 AM Isaac Langford, PT [facility]   9/14/2022  8:00 AM German Taylor [facility]   9/19/2022  8:00 AM Isaac Langford, PT [facility]   9/21/2022  8:00 AM German Arayaer [facility]   9/26/2022  8:00 AM Isaac Langford, PT [facility]   9/28/2022  8:00 AM German Taylor [facility]

## 2022-08-31 ENCOUNTER — HOSPITAL ENCOUNTER (OUTPATIENT)
Dept: PHYSICAL THERAPY | Age: 67
Discharge: HOME | End: 2022-08-31
Payer: MEDICARE

## 2022-08-31 PROCEDURE — 97110 THERAPEUTIC EXERCISES: CPT

## 2022-08-31 PROCEDURE — 97140 MANUAL THERAPY 1/> REGIONS: CPT

## 2022-08-31 PROCEDURE — 97530 THERAPEUTIC ACTIVITIES: CPT

## 2022-08-31 NOTE — PROGRESS NOTES
PHYSICAL THERAPY - DAILY TREATMENT NOTE    Patient Name: Myrtle Eden        Date: 2022  : 1955   YES Patient  Verified  Visit #:   32      30  Insurance: Payor: Tamika Larisa / Plan: [place] MEDICARE PART A & B / Product Type: Medicare /      In time: 329 Out time: 850   Total Treatment Time: 55     BCBS/Medicare Time Tracking (below)   Total Timed Codes (min):  45 1:1 Treatment Time:  45     TREATMENT AREA =  Pain in left shoulder [M25.512]    SUBJECTIVE  Pain Level (on 0 to 10 scale):  0  / 10   Medication Changes/New allergies or changes in medical history, any new surgeries or procedures? NO    If yes, update Summary List   Subjective Functional Status/Changes:  []  No changes reported     Patient reports that she was walking in the yard, stubbed her toe, and fell into a tree with her left arm extended. She didn't have any immediate pain or notice incrase in swelling or bruising. She did have soreness that night and has been icing it more.            Modalities Rationale:     decrease inflammation and decrease pain to improve patient's ability to perform pain free ADLs   min [] Estim, type/location:                                      []  att     []  unatt     []  w/US     []  w/ice    []  w/heat    min []  Mechanical Traction: type/lbs                   []  pro   []  sup   []  int   []  cont    []  before manual    []  after manual    min []  Ultrasound, settings/location:      min []  Iontophoresis w/ dexamethasone, location:                                               []  take home patch       []  in clinic   10 min [x]  Ice     []  Heat    location/position: CP to L shoulder ins itting with pillow under arm    min []  Vasopneumatic Device, press/temp:    If using vaso (only need to measure limb vaso being performed on)      pre-treatment girth :       post-treatment girth :       measured at (landmark location) :      min []  Other:    [] Skin assessment post-treatment (if applicable): []  intact    []  redness- no adverse reaction                  []redness - adverse reaction:        15 min Therapeutic Exercise:  [x]  See flow sheet   Rationale:      increase ROM, increase strength, improve coordination, improve balance, and increase proprioception to improve the patients ability to perform unlimited ADls      15 min Manual Therapy: PROM into flexion/scaption, abduction and ER   Rationale:      decrease pain, increase ROM, and increase tissue extensibility to improve patient's ability to perform pain   The manual therapy interventions were performed at a separate and distinct time from the therapeutic activities interventions.     15 min Therapeutic Activity: [x]  See flow sheet   Rationale:    increase ROM, increase strength, improve coordination, improve balance, and increase proprioception to improve the patients ability to perform pain free eventual lifting      Billed With/As:   [] TE   [] TA   [] Neuro   [] Self Care Patient Education: [x] Review HEP    [] Progressed/Changed HEP based on:   [] positioning   [] body mechanics   [] transfers   [] heat/ice application    [] other:      Other Objective/Functional Measures:    Did not progress exercises due to incident yesterday     Post Treatment Pain Level (on 0 to 10) scale:   0  / 10     ASSESSMENT  Assessment/Changes in Function:     Patient only had slight increase in discomfort during UBE at end of session today, active [facility] [place] ER and ABD did not bother her.     []  See Progress Note/Recertification   Patient will continue to benefit from skilled PT services to modify and progress therapeutic interventions, address functional mobility deficits, address ROM deficits, address strength deficits, analyze and address soft tissue restrictions, analyze and cue movement patterns, analyze and modify body mechanics/ergonomics, assess and modify postural abnormalities, address imbalance/dizziness, and instruct in home and community integration to attain remaining goals. Progress toward goals / Updated goals:    Progressing towards LTG 2.       PLAN  [x]  Upgrade activities as tolerated YES Continue plan of care   []  Discharge due to :    []  Other:      Therapist: Harry Lindsey    Date: 8/31/2022 Time: 10:05 AM     Future Appointments   Date Time Provider Trev Ragsdale   9/1/2022 11:20 AM Maddie Rankin NP [facility]   9/6/2022  8:00 AM Rebecca Danielle, PT [facility]   9/8/2022  8:00 AM Rebecca Danielle, PT [facility]   9/12/2022  8:00 AM Rebecca Danielle, PT [facility]   9/14/2022  8:00 AM Leonie Sheer [facility]   9/19/2022  8:00 AM Rebecca Danielle, PT [facility]   9/21/2022  8:00 AM Rolm Sheer [facility]   9/26/2022  8:00 AM Rebecca Danielle, PT [facility]   9/28/2022  8:00 AM Rolm Sheer [facility]

## 2022-09-06 ENCOUNTER — HOSPITAL ENCOUNTER (OUTPATIENT)
Dept: PHYSICAL THERAPY | Age: 67
Discharge: HOME | End: 2022-09-06
Payer: MEDICARE

## 2022-09-06 PROCEDURE — 97530 THERAPEUTIC ACTIVITIES: CPT

## 2022-09-06 PROCEDURE — 97110 THERAPEUTIC EXERCISES: CPT

## 2022-09-06 PROCEDURE — 97140 MANUAL THERAPY 1/> REGIONS: CPT

## 2022-09-06 NOTE — PROGRESS NOTES
[address] [place] [address] PHYSICAL THERAPY AT [address] [place] [address]  [address] Magallanes [address], [address] ,[address], [address]  Phone: [phone]  Fax: [phone] OF CARE/RECERTIFICATION FOR PHYSICAL THERAPY          Patient Name: Meka Maldonado : 1955   Treatment/Medical Diagnosis: Pain in left shoulder [M25.512]  S/p L RCR, SAD, biceps tenodesis, capsular release & manipulation (DOS 22)   Onset Date: 22    Referral Source: Wilber Lopez MD [place] [facility]): 22   Prior Hospitalization: See Medical History Provider #: [id number]   Prior Level of Function: Painful overhead reaching, able to lift 40 pound bags of mulch and garden, L hand dominant   Comorbidities: Arthritis, depression, HTN   Medications: Verified on Patient Summary List   [facility]: 28 Missed Visits: 0     Goal/Measure of Progress Goal Met? 1. Patient will report decreased c/o pain to < or = 3-4/10 in RIGHT/LEFT shoulder to facilitate return to light housework with manageable sx. Status at last Eval: Minimal pain on L  Pain at worst on R 7-8/10 Current Status: No c/o pain on L  Average pain on R at 3-4/10 yes   2. Patient will restore AROM to flexion 0-170 degrees, FXL ER to posterior occiput, FXL IR to L4 to allow for ease with dressing   Status at last Eval: PROM  Flexion 0-155 degrees  ER t@ 60 deg of ABD to neutral Current Status: Flex in standin degrees  [email or web address] of ABD: 60 degree in sup  IR to level of lateral hip  progressing   3. Patient will increase FOTO score to >/= 58 in order to allow for ease with reaching shoulder heigh shelves. Status at last Eval: 29 points Current Status: 48 points Progressing    4. Pt will increase strength of the L shoulder to 3/5 in order to allow for ease with light lifting   Status at last Eval: NA Current Status: Flex & ABD 4-/5  ER/IR to be assessed Progressing     Key Functional Changes/Progress: Mrs. Orestes Warren continues to make good progess with PT & generally reports no c/o pain in L shoulder. Good tolerance to progress to gentle strengthening of L shoulder. L shoulder AROM/PROM as well as strength continues to improve, she is now able to roll onto L UE for short periods of time. Mild signs of swelling & warmth to palpation along L upper arm/deltoid, this has remain unchanged & is not painful. Previous c/o R shoulder pain has also remain unchanged, average pain level at 3/10, at worst 3-4/10 with progressive use of R UE with ADLs. Problem List: pain affecting function, decrease ROM, decrease strength, decrease ADL/ functional abilitiies, decrease activity tolerance, decrease flexibility/ joint mobility, and decrease transfer abilities   Treatment Plan may include any combination of the following: Therapeutic exercise, Therapeutic activities, Neuromuscular re-education, Physical agent/modality, Manual therapy, Aquatic therapy, Patient education, Self Care training, Functional mobility training, and Home safety training  Patient Goal(s) has been updated and includes: \"Return to normal ADLs\"   Goals for this certification period include and are to be achieved in   3-4  weeks:  1) Improve FOTO score from 48 points to > or = 58 points indicating improved tolerance with ADLs in regards to L UE. 2) Improve overall strength of L shoulder to 4/5 so strength is available for return to light lifting activities at work/home. 3) Improve L shoulder AROM for flex/scaption in standing to 135 degrees, IR to functional level of L4 so ROM is available for dressing activities and/or overhead reaching. 4) Patient to be independent & compliant with HEP in preparation for D/C. Frequency / Duration:   Patient to be seen   3   times per week for   4-6    weeks:    Assessments/Recommendations: Patient to continue with PT for up to 3-4 more weeks in order to progress towards achieving all LTGs.   If you have any questions/comments please contact us directly at [phone]. Thank you for allowing us to assist in the care of your patient. Therapist Signature: MANPREET Youssef cert MDT Date: 1/0/6192   Certification Period:  Reporting Period: 9-06-22 to 12-04-22 8-05-22 to 9-06-22 Time: 8:36 AM   NOTE TO PHYSICIAN:  PLEASE COMPLETE THE ORDERS BELOW AND FAX TO   [facility]: [phone]. If you are unable to process this request in 24 hours please contact our office: [phone].    ___ I have read the above report and request that my patient continue as recommended.   ___ I have read the above report and request that my patient continue therapy with the following changes/special instructions: ________________________________________________   ___ I have read the above report and request that my patient be discharged from therapy.      Physician Signature:                                                             Date:                                     Time:                                                                       Etta Hernández MD

## 2022-09-06 NOTE — PROGRESS NOTES
PHYSICAL THERAPY - DAILY TREATMENT NOTE    Patient Name: Lito Cordova        Date: 2022  : 1955   YES Patient  Verified  Visit #:     Insurance: Payor: Becky Da Silva / Plan: [place] MEDICARE PART A & B / Product Type: Medicare /      In time: 8:00 A Out time: 9:05 A   Total Treatment Time: 60     BCBS/Medicare Time Tracking (below)   Total Timed Codes (min):  50 1:1 Treatment Time:  45     TREATMENT AREA =  Pain in left shoulder [M25.512]    SUBJECTIVE  Pain Level (on 0 to 10 scale):  0  / 10   Medication Changes/New allergies or changes in medical history, any new surgeries or procedures?     NO    If yes, update Summary List   Subjective Functional Status/Changes:  []  No changes reported     See progress note to MD            Modalities Rationale:     decrease edema, decrease inflammation, and decrease pain to improve patient's ability to return to pain-free ADLs    min [] Estim, type/location:                                      []  att     []  unatt     []  w/US     []  w/ice    []  w/heat    min []  Mechanical Traction: type/lbs                   []  pro   []  sup   []  int   []  cont    []  before manual    []  after manual    min []  Ultrasound, settings/location:      min []  Iontophoresis w/ dexamethasone, location:                                               []  take home patch       []  in clinic   10 min [x]  Ice     []  Heat    location/position: Seated to L shoulder     min []  Vasopneumatic Device, press/temp:    If using vaso (only need to measure limb vaso being performed on)      pre-treatment girth :       post-treatment girth :       measured at (landmark location) :      min []  Other:    [] Skin assessment post-treatment (if applicable):    [x]  intact    [x]  redness- no adverse reaction                  []redness - adverse reaction:        20/  15 min Therapeutic Exercise:  [x]  See flow sheet   Rationale:      increase ROM, increase strength, and increase proprioception to improve the patients ability to return to light lifting at home     15 min Therapeutic Activity: [x]  See flow sheet   Rationale:    increase ROM, improve coordination, and increase proprioception to improve the patients ability to perform light yardwork with out increase in L shoulder pain    15 min Manual Therapy: PROM for flex/scaption, [email or web address] of ABD in supine, see ROM improvements below   Rationale:      decrease pain, increase ROM, and increase postural awareness to improve patient's ability to return to unlimited elevation overhead  The manual therapy interventions were performed at a separate and distinct time from the therapeutic activities interventions. Billed With/As:   [] TE   [] TA   [] Neuro   [] Self Care Patient Education: [x] Review HEP    [] Progressed/Changed HEP based on:   [] positioning   [] body mechanics   [] transfers   [] heat/ice application    [] other:      Other Objective/Functional Measures: FOTO: 48 points  AROM/PROM see progress note to MD     Post Treatment Pain Level (on 0 to 10) scale:   0  / 10     ASSESSMENT  Assessment/Changes in Function:     Steady progress with ROM/strength with L shoulder, previous R shoulder pain remains unchanged     []  See Progress Note/Recertification   Patient will continue to benefit from skilled PT services to modify and progress therapeutic interventions, address functional mobility deficits, address ROM deficits, address strength deficits, analyze and address soft tissue restrictions, analyze and cue movement patterns, analyze and modify body mechanics/ergonomics, assess and modify postural abnormalities, and instruct in home and community integration to attain remaining goals.    Progress toward goals / Updated goals:  See progress note for progress with goals      PLAN  []  Upgrade activities as tolerated YES Continue plan of care   []  Discharge due to :    []  Other:      Therapist: Lillian Or, PT    Date: 9/6/2022 Time: 8:23 AM     Future Appointments   Date Time Provider Trev Ragsdale   9/8/2022  8:00 AM Brendan Salmon, PT [facility] [address]   9/12/2022  8:00 AM Brendan Salmon, PT [facility] [address]   9/14/2022  8:00 AM Norayn Nisreen [facility] [address]   9/19/2022  8:00 AM Brendan Salmon, PT [facility] [address]   9/21/2022  8:00 AM Jacqlyn Nisreen [facility] [address]   9/26/2022  8:00 AM Brendan Salmon, PT [facility] [address]   9/28/2022  8:00 AM Jacqlyn Nisreen [facility] [address]   10/3/2022  7:40 AM Brendan Salomn, PT [facility] [address]   10/5/2022  8:20 AM Michael Nielson [facility] [address]   10/10/2022  7:40 AM Brendan Salmon, PT [facility] [address]   10/12/2022  8:20 AM Ryan Vl, PT [facility] [address]   10/17/2022  7:40 AM Brendan Salmon, PT [facility] [address]   10/19/2022  8:20 AM Ryan Lv, PT [facility] [address]   10/24/2022  7:40 AM Brendan Salmon, PT [facility] [address]   10/26/2022  8:20 AM Ryan Lv, PT [facility] [address]   10/31/2022  7:40 AM Brendan Salmon, PT [facility] [address]   11/2/2022  8:20 AM Ryan Lv, PT [facility] [address]   9/1/2023 11:40 AM Holley [facility] FARTUN White

## 2022-09-08 ENCOUNTER — HOSPITAL ENCOUNTER (OUTPATIENT)
Dept: PHYSICAL THERAPY | Age: 67
Discharge: HOME | End: 2022-09-08
Payer: MEDICARE

## 2022-09-08 PROCEDURE — 97140 MANUAL THERAPY 1/> REGIONS: CPT

## 2022-09-08 PROCEDURE — 97530 THERAPEUTIC ACTIVITIES: CPT

## 2022-09-08 PROCEDURE — 97110 THERAPEUTIC EXERCISES: CPT

## 2022-09-08 NOTE — PROGRESS NOTES
PHYSICAL THERAPY - DAILY TREATMENT NOTE    Patient Name: Saima Green        Date: 2022  : 1955   YES Patient  Verified  Visit #:   34   of   39  Insurance: Payor: Lc Rollins / Plan: [place] MEDICARE PART A & B / Product Type: Medicare /      In time: 8:00 A Out time: 9:15 A   Total Treatment Time: 70     BCBS/Medicare Time Tracking (below)   Total Timed Codes (min):  60 1:1 Treatment Time:  45     TREATMENT AREA =  Pain in left shoulder [M25.512]  SUBJECTIVE  Pain Level (on 0 to 10 scale):  0  / 10   Medication Changes/New allergies or changes in medical history, any new surgeries or procedures? NO    If yes, update Summary List   Subjective Functional Status/Changes:  []  No changes reported     Patient reports the swelling on the outside of her arm seems to be less.             Modalities Rationale:     decrease edema, decrease inflammation, and decrease pain to improve patient's ability to return to pain-free use of L shoulder with ADLs    min [] Estim, type/location:                                      []  att     []  unatt     []  w/US     []  w/ice    []  w/heat    min []  Mechanical Traction: type/lbs                   []  pro   []  sup   []  int   []  cont    []  before manual    []  after manual    min []  Ultrasound, settings/location:      min []  Iontophoresis w/ dexamethasone, location:                                               []  take home patch       []  in clinic   10 min [x]  Ice     []  Heat    location/position: Seated to L shoulder     min []  Vasopneumatic Device, press/temp:    If using vaso (only need to measure limb vaso being performed on)      pre-treatment girth :       post-treatment girth :       measured at (landmark location) :      min []  Other:    [] Skin assessment post-treatment (if applicable):    [x]  intact    [x]  redness- no adverse reaction                  []redness - adverse reaction:        25/  15 min Therapeutic Exercise:  [x]  See flow sheet Rationale:      increase ROM and increase strength to improve the patients ability to return to eventual lifting     20/  15 min Therapeutic Activity: [x]  See flow sheet   Rationale:    increase ROM, increase strength, improve coordination, and improve balance to improve the patients ability to perform reaching behind back for washing/hygiene    15 min Manual Therapy: PROM for flex/scaption, [email or web address] of [email or web address] in supine   Rationale:      decrease pain and increase ROM to improve patient's ability to reach overhead with good stability  The manual therapy interventions were performed at a separate and distinct time from the therapeutic activities interventions. Billed With/As:   [] TE   [] TA   [] Neuro   [] Self Care Patient Education: [x] Review HEP    [] Progressed/Changed HEP based on:   [] positioning   [] body mechanics   [] transfers   [] heat/ice application    [] other:      Other Objective/Functional Measures:    See flow sheet     Post Treatment Pain Level (on 0 to 10) scale:   0  / 10     ASSESSMENT  Assessment/Changes in Function:     Good tolerance to today's treatment, patient able to perform active scaption/elevation with good control      []  See Progress Note/Recertification   Patient will continue to benefit from skilled PT services to modify and progress therapeutic interventions, address functional mobility deficits, address ROM deficits, address strength deficits, analyze and address soft tissue restrictions, analyze and cue movement patterns, analyze and modify body mechanics/ergonomics, assess and modify postural abnormalities, and instruct in home and community integration to attain remaining goals.    Progress toward goals / Updated goals:    Progressing towards newly established LTGs      PLAN  []  Upgrade activities as tolerated YES Continue plan of care   []  Discharge due to :    []  Other:      Therapist: Maggie Root PT    Date: 9/8/2022 Time: 11:27 AM     Future Appointments   Date Time Provider Trev Melyssa   9/12/2022  8:00 AM Yessica Ni, PT [facility]   9/14/2022  8:00 AM Andrea Garcia [facility]   9/19/2022  8:00 AM Yessica Ni, PT [facility]   9/21/2022  8:00 AM Andrea Garcia [facility]   9/26/2022  8:00 AM Yessica Ni, PT [facility]   9/28/2022  8:00 AM Andrea Garcia [facility]   10/3/2022  7:40 AM Yessica Ni, PT [facility]   10/5/2022  8:20 AM Andrea Garcia [facility]   10/10/2022  7:40 AM Yessica Ni, PT [facility]   10/12/2022  8:20 AM Ame Raymundo, PT [facility]   10/17/2022  7:40 AM Yessica Ni, PT [facility]   10/19/2022  8:20 AM Bartow Breanne, PT [facility]   10/24/2022  7:40 AM Yessica Last, PT [facility]   10/26/2022  8:20 AM Bartow Breanne, PT [facility]   10/31/2022  7:40 AM Yessica Last, PT [facility]   11/2/2022  8:20 AM Bartow Breanne, PT [facility]   9/1/2023 11:40 AM Safia Babb, FARTUN White

## 2022-09-12 ENCOUNTER — HOSPITAL ENCOUNTER (OUTPATIENT)
Dept: PHYSICAL THERAPY | Age: 67
Discharge: HOME | End: 2022-09-12
Payer: MEDICARE

## 2022-09-12 PROCEDURE — 97110 THERAPEUTIC EXERCISES: CPT

## 2022-09-12 PROCEDURE — 97530 THERAPEUTIC ACTIVITIES: CPT

## 2022-09-12 PROCEDURE — 97140 MANUAL THERAPY 1/> REGIONS: CPT

## 2022-09-12 NOTE — PROGRESS NOTES
PHYSICAL THERAPY - DAILY TREATMENT NOTE    Patient Name: Soni Flowers        Date: 2022  : 1955   YES Patient  Verified  Visit #:   27   of   36  Insurance: Payor: Laine Reyes / Plan: [place] MEDICARE PART A & B / Product Type: Medicare /      In time: 7:50 A Out time: 8:50 A   Total Treatment Time: 55     BCBS/Medicare Time Tracking (below)   Total Timed Codes (min):  45 1:1 Treatment Time:  45     TREATMENT AREA =  Pain in left shoulder [M25.512]    SUBJECTIVE  Pain Level (on 0 to 10 scale):  0  / 10   Medication Changes/New allergies or changes in medical history, any new surgeries or procedures? NO    If yes, update Summary List   Subjective Functional Status/Changes:  []  No changes reported     Patient reports she had the best day after last PT, better than she has ever felt since having her surgery.             Modalities Rationale:     decrease edema, decrease inflammation, and decrease pain to improve patient's ability to return to pain-free use of L UE    min [] Estim, type/location:                                      []  att     []  unatt     []  w/US     []  w/ice    []  w/heat    min []  Mechanical Traction: type/lbs                   []  pro   []  sup   []  int   []  cont    []  before manual    []  after manual    min []  Ultrasound, settings/location:      min []  Iontophoresis w/ dexamethasone, location:                                               []  take home patch       []  in clinic   10 min [x]  Ice     []  Heat    location/position: Supine to L shoulder     min []  Vasopneumatic Device, press/temp:    If using vaso (only need to measure limb vaso being performed on)      pre-treatment girth :       post-treatment girth :       measured at (landmark location) :      min []  Other:    [] Skin assessment post-treatment (if applicable):    [x]  intact    [x]  redness- no adverse reaction                  []redness - adverse reaction:        15 min Therapeutic Exercise:  [x]  See flow sheet   Rationale:      increase ROM and increase strength to improve the patients ability to return to lifting using L UE      15 min Therapeutic Activity: [x]  See flow sheet   Rationale:    improve coordination, improve balance, and increase proprioception to improve the patients ability to return to pain-free dressing & reaching behind back    15 min Manual Therapy: PROM/gentle passive stretching for flex/scaption, [email or web address] of [email or web address] in supine   Rationale:      decrease pain and increase ROM to improve patient's ability to reach overhead using L UE  The manual therapy interventions were performed at a separate and distinct time from the therapeutic activities interventions. Billed With/As:   [] TE   [] TA   [] Neuro   [] Self Care Patient Education: [x] Review HEP    [] Progressed/Changed HEP based on:   [] positioning   [] body mechanics   [] transfers   [] heat/ice application    [] other:      Other Objective/Functional Measures:    See flow sheet  Added IR to sleeper stretch in L S/L today      Post Treatment Pain Level (on 0 to 10) scale:   0  / 10     ASSESSMENT  Assessment/Changes in Function:     Good tolerance to progression of capsular stretches     []  See Progress Note/Recertification   Patient will continue to benefit from skilled PT services to modify and progress therapeutic interventions, address functional mobility deficits, address ROM deficits, address strength deficits, analyze and address soft tissue restrictions, analyze and cue movement patterns, analyze and modify body mechanics/ergonomics, assess and modify postural abnormalities, and instruct in home and community integration to attain remaining goals.    Progress toward goals / Updated goals:    Progressing towards LTG 1 & 2     PLAN  []  Upgrade activities as tolerated YES Continue plan of care   []  Discharge due to :    []  Other:      Therapist: Lizet Andres, PT    Date: 9/12/2022 Time: 10:54 AM     Future Appointments Date Time Provider Trev Ragsdale   9/14/2022  8:00 AM Marcelle Dunia [facility]   9/19/2022  8:00 AM Vaughn Clubs, PT [facility]   9/21/2022  8:00 AM Marcelle Dunia [facility]   9/26/2022  8:00 AM Vaughn Clubs, PT [facility]   9/28/2022  8:00 AM Marcelle Dunia [facility]   10/3/2022  7:40 AM Vaughn Clubs, PT [facility]   10/5/2022  8:20 AM Marcelle Dunia [facility]   10/10/2022  7:40 AM Vaughn Clubs, PT [facility]   10/12/2022  8:20 AM Stephanie Morning, PT [facility] [place] [facility]   10/17/2022  7:40 AM Vaughn Clubs, PT [facility]   10/19/2022  8:20 AM Stephanie Morning, PT [facility]   10/24/2022  7:40 AM Vaughn Clubs, PT [facility]   10/26/2022  8:20 AM Stephanie Morning, PT [facility]   10/31/2022  7:40 AM Vaughn Clubs, PT [facility]   11/2/2022  8:20 AM Stephanie Morning, PT [facility]   9/1/2023 11:40 AM Kendell Babb, FARTUN White

## 2022-09-14 ENCOUNTER — HOSPITAL ENCOUNTER (OUTPATIENT)
Dept: PHYSICAL THERAPY | Age: 67
Discharge: HOME | End: 2022-09-14
Payer: MEDICARE

## 2022-09-14 PROCEDURE — 97110 THERAPEUTIC EXERCISES: CPT

## 2022-09-14 PROCEDURE — 97530 THERAPEUTIC ACTIVITIES: CPT

## 2022-09-14 PROCEDURE — 97140 MANUAL THERAPY 1/> REGIONS: CPT

## 2022-09-14 NOTE — PROGRESS NOTES
PHYSICAL THERAPY - DAILY TREATMENT NOTE    Patient Name: Cristhian Leavitt        Date: 2022  : 1955   YES Patient  Verified  Visit #:   32   of   40  Insurance: Payor: Lillie Grit / Plan: [place] MEDICARE PART A & B / Product Type: Medicare /      In time: 037 Out time: 900   Total Treatment Time: 65     BCBS/Medicare Time Tracking (below)   Total Timed Codes (min):  55 1:1 Treatment Time:  55     TREATMENT AREA =  Pain in left shoulder [M25.512]    SUBJECTIVE  Pain Level (on 0 to 10 scale):  0  / 10   Medication Changes/New allergies or changes in medical history, any new surgeries or procedures? NO    If yes, update Summary List   Subjective Functional Status/Changes:  []  No changes reported     Patient reports that her arm has been doing good, she is using more and more.   Stiff in the morning (able to lift ot about 120 degrees flexion prior to start of session)          Modalities Rationale:     decrease inflammation and decrease pain to improve patient's ability to perform pain free ADLs    min [] Estim, type/location:                                      []  att     []  unatt     []  w/US     []  w/ice    []  w/heat    min []  Mechanical Traction: type/lbs                   []  pro   []  sup   []  int   []  cont    []  before manual    []  after manual    min []  Ultrasound, settings/location:      min []  Iontophoresis w/ dexamethasone, location:                                               []  take home patch       []  in clinic   10 min [x]  Ice     []  Heat    location/position: CP to L shoulder in sitting     min []  Vasopneumatic Device, press/temp:    If using vaso (only need to measure limb vaso being performed on)      pre-treatment girth :       post-treatment girth :       measured at (landmark location) :      min []  Other:    [] Skin assessment post-treatment (if applicable):    []  intact    []  redness- no adverse reaction                  []redness - adverse reaction:        15 min Therapeutic Exercise:  [x]  See flow sheet   Rationale:      increase ROM, increase strength, improve coordination, improve balance, and increase proprioception to improve the patients ability to perform unlimited ADLs      15 min Manual Therapy: PROM of the L shoulder into flexion/scaption, ER at neutral, 45 and 60 degrees abduction with end range pain free stretching   Rationale:      decrease pain, increase ROM, and increase tissue extensibility to improve patient's ability to erform pain free [place] motion  The manual therapy interventions were performed at a separate and distinct time from the therapeutic activities interventions. 25 min Therapeutic Activity: [x]  See flow sheet   Rationale:    increase ROM, increase strength, improve coordination, improve balance, and increase proprioception to improve the patients ability to improve ease with lifting and overhead motion        Billed With/As:   [] TE   [] TA   [] Neuro   [] Self Care Patient Education: [x] Review HEP    [] Progressed/Changed HEP based on:   [] positioning   [] body mechanics   [] transfers   [] heat/ice application    [] other:      Other Objective/Functional Measures: Added standing elevation of the L shoulder with elbows bent  Reviewed positining for sleeper and sleeper stretch with IR     Post Treatment Pain Level (on 0 to 10) scale:   0  / 10     ASSESSMENT  Assessment/Changes in Function:     Patient was able to elevate the arm without pain or discomfort to about 145 degrees after stretching today.   Patient educated to continue to avoid lifting with the LUE until her follow up with MD     []  See Progress Note/Recertification   Patient will continue to benefit from skilled PT services to modify and progress therapeutic interventions, address functional mobility deficits, address ROM deficits, address strength deficits, analyze and address soft tissue restrictions, analyze and cue movement patterns, analyze and modify body mechanics/ergonomics, assess and modify postural abnormalities, address imbalance/dizziness, and instruct in home and community integration to attain remaining goals. Progress toward goals / Updated goals:    Progressing towards LTG 2.       PLAN  [x]  Upgrade activities as tolerated YES Continue plan of care   []  Discharge due to :    []  Other:      Therapist: Ruby Carrizales    Date: 9/14/2022 Time: 9:20 AM     Future Appointments   Date Time Provider Trev Melyssa   9/19/2022  8:00 AM Sal Eddy, PT [facility] [address]   9/21/2022  8:00 AM Max Escamilla [facility] [address]   9/26/2022  8:00 AM Sal Eddy, PT [facility] [address]   9/28/2022  8:00 AM Max Escamilla [facility] [address]   10/3/2022  7:40 AM Sal Eddy, PT [facility] [address]   10/5/2022  8:20 AM Max Escamilla [facility] [address]   10/10/2022  7:40 AM Sal Eddy, PT [facility] [address]   10/12/2022  8:20 AM Sameer Merchant, PT [facility] [address]   10/17/2022  7:40 AM Sal Eddy, PT [facility] [address]   10/19/2022  8:20 AM Sameer Merchant, PT [facility] [address]   10/24/2022  7:40 AM Sal Eddy, PT [facility] [address]   10/26/2022  8:20 AM Sameer Merchant, PT [facility] [address]   10/31/2022  7:40 AM Sal Eddy, PT [facility] [address]   11/2/2022  8:20 AM Sameer Merchant, PT [facility] [address]   9/1/2023 11:40 AM Sundeep Babb Asp, NP [address] [facility]

## 2022-09-19 ENCOUNTER — HOSPITAL ENCOUNTER (OUTPATIENT)
Dept: PHYSICAL THERAPY | Age: 67
Discharge: HOME | End: 2022-09-19
Payer: MEDICARE

## 2022-09-19 PROCEDURE — 97110 THERAPEUTIC EXERCISES: CPT

## 2022-09-19 PROCEDURE — 97140 MANUAL THERAPY 1/> REGIONS: CPT

## 2022-09-19 PROCEDURE — 97530 THERAPEUTIC ACTIVITIES: CPT

## 2022-09-19 NOTE — PROGRESS NOTES
PHYSICAL THERAPY - DAILY TREATMENT NOTE    Patient Name: Vicki Ramos        Date: 2022  : 1955   YES Patient  Verified  Visit #:   28   of   40  Insurance: Payor: Farooq Keep / Plan: [place] MEDICARE PART A & B / Product Type: Medicare /      In time: 7:55 A Out time: 9:10 A   Total Treatment Time: 65     BCBS/Medicare Time Tracking (below)   Total Timed Codes (min):  55 1:1 Treatment Time:  55     TREATMENT AREA =  Pain in left shoulder [M25.512]    SUBJECTIVE  Pain Level (on 0 to 10 scale):  5  / 10   Medication Changes/New allergies or changes in medical history, any new surgeries or procedures? NO    If yes, update Summary List   Subjective Functional Status/Changes:  []  No changes reported     Patient reports she will have follow up with MD tomorrow, no problems over the weekend, she was able to do some yard work without any problems.             Modalities Rationale:     decrease edema, decrease inflammation, and decrease pain to improve patient's ability to return to pain-free ADLs    min [] Estim, type/location:                                      []  att     []  unatt     []  w/US     []  w/ice    []  w/heat    min []  Mechanical Traction: type/lbs                   []  pro   []  sup   []  int   []  cont    []  before manual    []  after manual    min []  Ultrasound, settings/location:      min []  Iontophoresis w/ dexamethasone, location:                                               []  take home patch       []  in clinic   10 min [x]  Ice     []  Heat    location/position: CP to L shoulder in sitting    min []  Vasopneumatic Device, press/temp:    If using vaso (only need to measure limb vaso being performed on)      pre-treatment girth :       post-treatment girth :       measured at (landmark location) :      min []  Other:    [] Skin assessment post-treatment (if applicable):    []  intact    []  redness- no adverse reaction                  []redness - adverse reaction:        15 min Therapeutic Exercise:  [x]  See flow sheet   Rationale:      increase ROM and increase strength to improve the patients ability to return to pain-free ADLs      25 min Therapeutic Activity: [x]  See flow sheet   Rationale:    increase ROM, increase strength, and increase proprioception to improve the patients ability to tolerate transfers from the floor to standing    15 min Manual Therapy: PROM for flex/scaption, [email or web address] of ABD in supine   Rationale:      decrease pain and increase ROM to improve patient's ability to reach overhead  The manual therapy interventions were performed at a separate and distinct time from the therapeutic activities interventions. Billed With/As:   [] TE   [] TA   [] Neuro   [] Self Care Patient Education: [x] Review HEP    [] Progressed/Changed HEP based on:   [] positioning   [] body mechanics   [] transfers   [] heat/ice application    [] other:      Other Objective/Functional Measures: Added quadraped UE weightshifting/lift today, prone retraction & prone horizontal ABD today     Post Treatment Pain Level (on 0 to 10) scale:   0  / 10     ASSESSMENT  Assessment/Changes in Function:     Good tolerance to today's scapular progressions as well as progressions into weightbearing/closed chain postures     []  See Progress Note/Recertification   Patient will continue to benefit from skilled PT services to modify and progress therapeutic interventions, address functional mobility deficits, address ROM deficits, address strength deficits, analyze and address soft tissue restrictions, analyze and cue movement patterns, analyze and modify body mechanics/ergonomics, assess and modify postural abnormalities, address imbalance/dizziness, and instruct in home and community integration to attain remaining goals.    Progress toward goals / Updated goals:    Progressing towards LTG 2     PLAN  []  Upgrade activities as tolerated YES Continue plan of care   []  Discharge due to :    []  Other: Therapist: Meliza Sandoval PT    Date: 9/19/2022 Time: 10:26 AM     Future Appointments   Date Time Provider Trev Ragsdale   9/21/2022  8:00 AM Sydna Dancer [facility]   9/26/2022  8:00 AM Kate Soto, PT [facility]   9/28/2022  8:00 AM Sydna Dancer [facility]   10/3/2022  7:40 AM Kate Soto, PT [facility]   10/5/2022  8:20 AM Sydna Dancer [facility]   10/10/2022  7:40 AM Kate Soto, PT [facility]   10/12/2022  8:20 AM Ronnie Garza, PT [facility]   10/17/2022  7:40 AM Kate Soto, PT [facility]   10/19/2022  8:20 AM Ronnie Garza, PT [facility]   10/24/2022  7:40 AM Kate Soto, PT [facility]   10/26/2022  8:20 AM Ronnie Garza, PT [facility]   10/31/2022  7:40 AM Kate Soto, PT [facility]   11/2/2022  8:20 AM Ronnie Garza, PT [facility]   9/1/2023 11:40 AM Jaydon Babb, NP [address]

## 2022-09-21 ENCOUNTER — HOSPITAL ENCOUNTER (OUTPATIENT)
Dept: PHYSICAL THERAPY | Age: 67
Discharge: HOME | End: 2022-09-21
Payer: MEDICARE

## 2022-09-21 PROCEDURE — 97112 NEUROMUSCULAR REEDUCATION: CPT

## 2022-09-21 PROCEDURE — 97110 THERAPEUTIC EXERCISES: CPT

## 2022-09-21 PROCEDURE — 97530 THERAPEUTIC ACTIVITIES: CPT

## 2022-09-21 NOTE — PROGRESS NOTES
PHYSICAL THERAPY - DAILY TREATMENT NOTE    Patient Name: Xavier Ruiz        Date: 2022  : 1955   YES Patient  Verified  Visit #:   35   of   40  Insurance: Payor: Rito Maria / Plan: [place] MEDICARE PART A & B / Product Type: Medicare /      In time: 729 Out time: 900   Total Treatment Time: 65     BCBS/Medicare Time Tracking (below)   Total Timed Codes (min):  55 1:1 Treatment Time:  53     TREATMENT AREA =  Pain in left shoulder [M25.512]    SUBJECTIVE  Pain Level (on 0 to 10 scale):  0  / 10   Medication Changes/New allergies or changes in medical history, any new surgeries or procedures? NO    If yes, update Summary List   Subjective Functional Status/Changes:  []  No changes reported     Patient reports that she saw the PA and she is going to have an MRI on her right shoulder tommorow. She will follow up with her surgical arm in two months and the PA believes that the swelling on her lateral arm can be a lipoma.     She reports that she had some muscle soreness after last visit           Modalities Rationale:     decrease inflammation and decrease pain to improve patient's ability to perform pain free ADLs    min [] Estim, type/location:                                      []  att     []  unatt     []  w/US     []  w/ice    []  w/heat    min []  Mechanical Traction: type/lbs                   []  pro   []  sup   []  int   []  cont    []  before manual    []  after manual    min []  Ultrasound, settings/location:      min []  Iontophoresis w/ dexamethasone, location:                                               []  take home patch       []  in clinic   10 min [x]  Ice     []  Heat    location/position: CP to L shoulder in sitting     min []  Vasopneumatic Device, press/temp:    If using vaso (only need to measure limb vaso being performed on)      pre-treatment girth :       post-treatment girth :       measured at (landmark location) :      min []  Other:    [] Skin assessment post-treatment (if applicable):    []  intact    []  redness- no adverse reaction                  []redness - adverse reaction:        20/18 min Therapeutic Exercise:  [x]  See flow sheet   Rationale:      increase ROM, increase strength, improve coordination, improve balance, and increase proprioception to improve the patients ability to improve ease with ADLs      10 min Manual Therapy: PROM into flexion/scaption and ER at neutral, 45 abductions and 60 degrees abduction   Rationale:      decrease pain and increase ROM to improve patient's ability to improve ease with over head lifting  The manual therapy interventions were performed at a separate and distinct time from the therapeutic activities interventions. 25 min Therapeutic Activity: [x]  See flow sheet   Rationale:    increase ROM, increase strength, improve coordination, improve balance, and increase proprioception to improve the patients ability to improve ease with lifting       Billed With/As:   [] TE   [] TA   [] Neuro   [] Self Care Patient Education: [x] Review HEP    [] Progressed/Changed HEP based on:   [] positioning   [] body mechanics   [] transfers   [] heat/ice application    [] other:      Other Objective/Functional Measures: Added L UE theraband ER, IR and row     Post Treatment Pain Level (on 0 to 10) scale:   0  / 10     ASSESSMENT  Assessment/Changes in Function:     Patient is making good progress with PT Rx and tolerated new exercises well without increase in pain or soreness.       []  See Progress Note/Recertification   Patient will continue to benefit from skilled PT services to modify and progress therapeutic interventions, address functional mobility deficits, address ROM deficits, address strength deficits, analyze and address soft tissue restrictions, analyze and cue movement patterns, analyze and modify body mechanics/ergonomics, assess and modify postural abnormalities, address imbalance/dizziness, and instruct in home and community integration to attain remaining goals. Progress toward goals / Updated goals:    Progressing towards LTG 2.       PLAN  [x]  Upgrade activities as tolerated YES Continue plan of care   []  Discharge due to :    []  Other:      Therapist: Brendan Limon    Date: 9/21/2022 Time: 7:56 AM     Future Appointments   Date Time Provider Trev Ragsdale   9/21/2022  8:00 AM Bladimir Confer [facility]   9/26/2022  8:00 AM Alma Jacobsen, PT [facility]   9/28/2022  8:00 AM Bladimir Confer [facility]   10/3/2022  7:40 AM Alma Jacobsen, PT [facility]   10/5/2022  8:20 AM Bladimir Confer [facility]   10/10/2022  7:40 AM Alma Jacobsen, PT [facility]   10/12/2022  8:20 AM Jane Nailer, PT [facility]   10/17/2022  7:40 AM Alma Jacobsen, PT [facility]   10/19/2022  8:20 AM Jane Nailer, PT [facility]   10/24/2022  7:40 AM Alma Jacobsen, PT [facility]   10/26/2022  8:20 AM Jane Nailer, PT [facility]   10/31/2022  7:40 AM Alma Jacobsen, PT [facility]   11/2/2022  8:20 AM Jane Nailer, PT [facility]   9/1/2023 11:40 AM Holley, [facility], FARTUN White

## 2022-09-26 ENCOUNTER — HOSPITAL ENCOUNTER (OUTPATIENT)
Dept: PHYSICAL THERAPY | Age: 67
Discharge: HOME | End: 2022-09-26
Payer: MEDICARE

## 2022-09-26 PROCEDURE — 97140 MANUAL THERAPY 1/> REGIONS: CPT

## 2022-09-26 PROCEDURE — 97110 THERAPEUTIC EXERCISES: CPT

## 2022-09-26 PROCEDURE — 97530 THERAPEUTIC ACTIVITIES: CPT

## 2022-09-26 NOTE — PROGRESS NOTES
PHYSICAL THERAPY - DAILY TREATMENT NOTE    Patient Name: Saima Green        Date: 2022  : 1955   YES Patient  Verified  Visit #:   29   of   40  Insurance: Payor: Lc Rollins / Plan: [place] MEDICARE PART A & B / Product Type: Medicare /      In time: 7:55 A Out time: 9:10 A   Total Treatment Time: 70     BCBS/Medicare Time Tracking (below)   Total Timed Codes (min):  60 1:1 Treatment Time:  50     TREATMENT AREA =  Pain in left shoulder [M25.512]    SUBJECTIVE  Pain Level (on 0 to 10 scale):  0  / 10   Medication Changes/New allergies or changes in medical history, any new surgeries or procedures? NO    If yes, update Summary List   Subjective Functional Status/Changes:  []  No changes reported     Patient reports she had MRI for R shoulder on Friday & she has her results from her MyChart, she is waiting for MD office to call her to schedule follow up to review results of MRI.             Modalities Rationale:     decrease edema, decrease inflammation, and decrease pain to improve patient's ability to return to pain-free ADLs    min [] Estim, type/location:                                      []  att     []  unatt     []  w/US     []  w/ice    []  w/heat    min []  Mechanical Traction: type/lbs                   []  pro   []  sup   []  int   []  cont    []  before manual    []  after manual    min []  Ultrasound, settings/location:      min []  Iontophoresis w/ dexamethasone, location:                                               []  take home patch       []  in clinic   10 min [x]  Ice     []  Heat    location/position: Seated to L shoulder     min []  Vasopneumatic Device, press/temp:    If using vaso (only need to measure limb vaso being performed on)      pre-treatment girth :       post-treatment girth :       measured at (landmark location) :      min []  Other:    [] Skin assessment post-treatment (if applicable):    []  intact    []  redness- no adverse reaction                  []redness - adverse reaction:        25 min Therapeutic Exercise:  [x]  See flow sheet   Rationale:      increase ROM and increase strength to improve the patients ability to return light lifting using L UE    25 min Therapeutic Activity: [x]  See flow sheet   Rationale:    increase ROM, increase strength, and increase proprioception to improve the patients ability to return reaching overhead without compensation       10 min Manual Therapy: PROM for flex/scaption, [email or web address] in supine   Rationale:      decrease pain and increase ROM to improve patient's ability to return to dressing without compensation   The manual therapy interventions were performed at separate and distinct time from the therapeutic activities interventions. Billed With/As:   [] TE   [] TA   [] Neuro   [] Self Care Patient Education: [x] Review HEP    [] Progressed/Changed HEP based on:   [] positioning   [] body mechanics   [] transfers   [] heat/ice application    [] other:      Other Objective/Functional Measures:    Progressed to PREs for L UE per flow sheet     Post Treatment Pain Level (on 0 to 10) scale:   0  / 10     ASSESSMENT  Assessment/Changes in Function:     Good tolerance to strength progression,improved tolerance to prone scapular strengthening with scapular retraction      []  See Progress Note/Recertification   Patient will continue to benefit from skilled PT services to modify and progress therapeutic interventions to attain remaining goals.    Progress toward goals / Updated goals:    Progressing towards LTG 1 & 2     PLAN  []  Upgrade activities as tolerated YES Continue plan of care   []  Discharge due to :    []  Other:      Therapist: Oscar Whitt PT    Date: 9/26/2022 Time: 10:37 AM     Future Appointments   Date Time Provider Trev Ragsdale   9/28/2022  8:00 AM Andrea Garcia [facility]   10/3/2022  7:40 AM Yessica Ni PT [facility]   10/5/2022  8:20 AM Andrea Garcia [facility]   10/10/2022  7:40 AM Lamount Abby, PT [facility]   10/12/2022  8:20 AM Gordo Risk, PT [facility]   10/17/2022  7:40 AM Lamount Abby, PT [facility]   10/19/2022  8:20 AM Gordo Risk, PT [facility]   10/24/2022  7:40 AM Lamount Abby, PT [facility]   10/26/2022  8:20 AM Gordo Risk, PT [facility]   10/31/2022  7:40 AM Lamount Abby, PT [facility]   11/2/2022  8:20 AM Gordo Risk, PT [facility]   9/1/2023 11:40 AM Kiara Babb, NP [address]

## 2022-09-28 ENCOUNTER — HOSPITAL ENCOUNTER (OUTPATIENT)
Dept: PHYSICAL THERAPY | Age: 67
Discharge: HOME | End: 2022-09-28
Payer: MEDICARE

## 2022-09-28 PROCEDURE — 97140 MANUAL THERAPY 1/> REGIONS: CPT

## 2022-09-28 PROCEDURE — 97110 THERAPEUTIC EXERCISES: CPT

## 2022-09-28 PROCEDURE — 97530 THERAPEUTIC ACTIVITIES: CPT

## 2022-09-28 NOTE — PROGRESS NOTES
PHYSICAL THERAPY - DAILY TREATMENT NOTE    Patient Name: Valentín Peck        Date: 2022  : 1955   YES Patient  Verified  Visit #:   28   of   40  Insurance: Payor: Raj Chowdhury / Plan: [place] MEDICARE PART A & B / Product Type: Medicare /      In time: 648 Out time: 249   Total Treatment Time: 70     BCBS/Medicare Time Tracking (below)   Total Timed Codes (min):  60 1:1 Treatment Time:  60     TREATMENT AREA =  Pain in left shoulder [M25.512]    SUBJECTIVE  Pain Level (on 0 to 10 scale):  0  / 10   Medication Changes/New allergies or changes in medical history, any new surgeries or procedures? NO    If yes, update Summary List   Subjective Functional Status/Changes:  []  No changes reported     Patient reports that she notices improvements in her left arm every week.   She was able to blow dry/wash the top of her hair           Modalities Rationale:     decrease inflammation and decrease pain to improve patient's ability to perform pain free ADLs    min [] Estim, type/location:                                      []  att     []  unatt     []  w/US     []  w/ice    []  w/heat    min []  Mechanical Traction: type/lbs                   []  pro   []  sup   []  int   []  cont    []  before manual    []  after manual    min []  Ultrasound, settings/location:      min []  Iontophoresis w/ dexamethasone, location:                                               []  take home patch       []  in clinic   10 min [x]  Ice     []  Heat    location/position: CP to L arm in sitting     min []  Vasopneumatic Device, press/temp:    If using vaso (only need to measure limb vaso being performed on)      pre-treatment girth :       post-treatment girth :       measured at (landmark location) :      min []  Other:    [] Skin assessment post-treatment (if applicable):    []  intact    []  redness- no adverse reaction                  []redness - adverse reaction:        20 min Therapeutic Exercise:  [x]  See flow sheet Rationale:      increase ROM, increase strength, improve coordination, improve balance, and increase proprioception to improve the patients ability to improve ease with ADLs      10 min Manual Therapy: PROM with end range stretching into ER at neutral, 45 and 70 degrees ABD and flexion/scaption   Rationale:      decrease pain, increase ROM, and increase tissue extensibility to improve patient's ability to ain free [place] motion  The manual therapy interventions were performed at a separate and distinct time from the therapeutic activities interventions. 30 min Therapeutic Activity: [x]  See flow sheet   Rationale:    increase ROM, increase strength, improve coordination, improve balance, and increase proprioception to improve the patients ability to improve ease with lifting    Billed With/As:   [] TE   [] TA   [] Neuro   [] Self Care Patient Education: [x] Review HEP    [] Progressed/Changed HEP based on:   [] positioning   [] body mechanics   [] transfers   [] heat/ice application    [] other:      Other Objective/Functional Measures:    PROM flexion/scaption 170 degrees, ER @ 45 deg ABD: 65 degrees  Standing elevation 0-155 degrees     Post Treatment Pain Level (on 0 to 10) scale:   0  / 10     ASSESSMENT  Assessment/Changes in Function:     Patient is making steady gains in strength and range of motion. She is tolerating weight bearing throughout the LUE well without increase in overall pain     []  See Progress Note/Recertification   Patient will continue to benefit from skilled PT services to modify and progress therapeutic interventions, address functional mobility deficits, address ROM deficits, address strength deficits, analyze and address soft tissue restrictions, analyze and cue movement patterns, analyze and modify body mechanics/ergonomics, assess and modify postural abnormalities, address imbalance/dizziness, and instruct in home and community integration to attain remaining goals.    Progress toward goals / Updated goals:    Progressing towards LTG 2.       PLAN  [x]  Upgrade activities as tolerated YES Continue plan of care   []  Discharge due to :    []  Other:      Therapist: Martha Jaime    Date: 9/28/2022 Time: 8:01 AM     Future Appointments   Date Time Provider Trev Ragsdale   10/3/2022  7:40 AM Vanessa Waldron, PT [facility]   10/5/2022  8:20 AM Murphy Ríosast, PT [facility]   10/10/2022  7:40 AM Vanessa Waldron, PT [facility]   10/12/2022  8:20 AM Murphy Ríosast, PT [facility]   10/17/2022  7:40 AM Vanessa Waldron, PT [facility]   10/19/2022  8:20 AM Murphy Ríosast, PT [facility]   10/24/2022  7:40 AM Vanessa Waldron, PT [facility]   10/26/2022  8:20 AM Murphy Ríosast, PT [facility]   10/31/2022  7:40 AM Vanessa Waldron, PT [facility]   11/2/2022  8:20 AM Murphy Ríosast, PT [facility]   9/1/2023 11:40 AM Leah Babb, NP [address] [facility]

## 2022-10-03 ENCOUNTER — HOSPITAL ENCOUNTER (OUTPATIENT)
Dept: PHYSICAL THERAPY | Age: 67
Discharge: HOME | End: 2022-10-03
Payer: MEDICARE

## 2022-10-03 PROCEDURE — 97140 MANUAL THERAPY 1/> REGIONS: CPT

## 2022-10-03 PROCEDURE — 97110 THERAPEUTIC EXERCISES: CPT

## 2022-10-03 PROCEDURE — 97530 THERAPEUTIC ACTIVITIES: CPT

## 2022-10-03 NOTE — PROGRESS NOTES
PHYSICAL THERAPY - DAILY TREATMENT NOTE    Patient Name: Danyell Villanueva        Date: 10/3/2022  : 1955   YES Patient  Verified  Visit #:   39   of   40  Insurance: Payor: Maggie Foster / Plan: [place] MEDICARE PART A & B / Product Type: Medicare /      In time: 7:40 A Out time: 8:50 A   Total Treatment Time: 65     BCBS/Medicare Time Tracking (below)   Total Timed Codes (min):  55 1:1 Treatment Time:  55     TREATMENT AREA =  Pain in left shoulder [M25.512]    SUBJECTIVE  Pain Level (on 0 to 10 scale):  0  / 10   Medication Changes/New allergies or changes in medical history, any new surgeries or procedures?     NO    If yes, update Summary List   Subjective Functional Status/Changes:  []  No changes reported     See progress note to MD            Modalities Rationale:     decrease edema, decrease inflammation, and decrease pain to improve patient's ability to return to safe ADLs     min [] Estim, type/location:                                      []  att     []  unatt     []  w/US     []  w/ice    []  w/heat    min []  Mechanical Traction: type/lbs                   []  pro   []  sup   []  int   []  cont    []  before manual    []  after manual    min []  Ultrasound, settings/location:      min []  Iontophoresis w/ dexamethasone, location:                                               []  take home patch       []  in clinic   10 min [x]  Ice     []  Heat    location/position: Seated to R/L shoulders     min []  Vasopneumatic Device, press/temp:    If using vaso (only need to measure limb vaso being performed on)      pre-treatment girth :       post-treatment girth :       measured at (landmark location) :      min []  Other:    [] Skin assessment post-treatment (if applicable):    [x]  intact    [x]  redness- no adverse reaction                  []redness - adverse reaction:        20 min Therapeutic Exercise:  [x]  See flow sheet   Rationale:      increase ROM and increase strength to improve the patients ability to tolerate light lifting     10 min Manual Therapy: PROM for flex/scaption, [email or web address] of ABD in supine   Rationale:      decrease pain and increase ROM to improve patient's ability to return to pain-free reaching overhead   The manual therapy interventions were performed at a separate and distinct time from the therapeutic activities interventions. 25 min Therapeutic Activity: [x]  See flow sheet   Rationale:    improve coordination, improve balance, and increase proprioception to improve the patients ability to return to pain-free reaching overhead       Billed With/As:   [] TE   [] TA   [] Neuro   [] Self Care Patient Education: [x] Review HEP    [] Progressed/Changed HEP based on:   [] positioning   [] body mechanics   [] transfers   [] heat/ice application    [] other:      Other Objective/Functional Measures:    Reviewed wall stretch for home  MMT/ROM see progress note     Post Treatment Pain Level (on 0 to 10) scale:   0  / 10     ASSESSMENT  Assessment/Changes in Function:     Steay progress wtith ROM/strength     []  See Progress Note/Recertification   Patient will continue to benefit from skilled PT services to modify and progress therapeutic interventions, address functional mobility deficits, address ROM deficits, address strength deficits, analyze and address soft tissue restrictions, analyze and cue movement patterns, analyze and modify body mechanics/ergonomics, assess and modify postural abnormalities, and instruct in home and community integration to attain remaining goals.    Progress toward goals / Updated goals:    See progress note for progress with goals      PLAN  []  Upgrade activities as tolerated YES Continue plan of care   []  Discharge due to :    []  Other:      Therapist: Maria Eugenia Adrian PT    Date: 10/3/2022 Time: 10:00 AM     Future Appointments   Date Time Provider Trev Ragsdale   10/5/2022  8:20 AM Pau Mcfadden PT [facility]   10/10/2022  7:40 AM Yareli Theresa Sanderson, PT [facility]   10/12/2022  8:20 AM Lala Urena, PT [facility]   10/17/2022  7:40 AM [place] Seat, PT [facility]   10/19/2022  8:20 AM Lala Urena, PT [facility]   10/24/2022  7:40 AM Jadiel Seat, PT [facility]   10/27/2022  8:20 AM Lala Urena, PT [facility]   10/31/2022  7:40 AM Jadiel Seat, PT [facility]   11/2/2022  8:20 AM Lala Urena, PT [facility]   9/1/2023 11:40 AM Roni Babb, FARTUN White

## 2022-10-04 NOTE — PROGRESS NOTES
[address] [place] [address] PHYSICAL THERAPY AT [address] [place] [address]  Nick Magallanes [address], [address] ,[address], [address] [facility] [address]  Phone: [phone]  Fax: [phone] OF CARE/RECERTIFICATION FOR PHYSICAL THERAPY          Patient Name: Surjit Topete : 1955   Treatment/Medical Diagnosis: Pain in left shoulder [M25.512]  S/p L RCR, SAD, biceps tenodesis, capsular release & manipulation   (DOS 22)   Onset Date: 22    Referral Source: Oma Obrien MD [facility]): 22   Prior Hospitalization: See Medical History Provider #: [id number]   Prior Level of Function: Painful overhead reaching, able to lift 40 pound bags of mulch and garden, L hand dominant   Comorbidities: Arthritis, depression, HTN   Medications: Verified on Patient Summary List   Visits from [facility]: 36 Missed Visits: 0     Goal/Measure of Progress Goal Met? 1. Improve FOTO score from 48 points to > or = 58 points indicating improved tolerance with ADLs in regards to L UE. Status at last Eval: 48 Current Status: 59 yes   2. Improve L shoulder AROM for flex/scaption in standing to 135 degrees, IR to functional level of L4 so ROM is available for dressing activities and/or overhead reaching. Status at last Eval: Flex in standin degrees  [email or web address] of ABD: 60 degree in sup  IR to level of lateral hip  Current Status: Flex in standin degrees  [email or web address] of ABD: 60 degree in sup  IR to level of distal glut Progressing   3. Improve overall strength of L shoulder to 4/5 so strength is available for return to light lifting activities at work/home. Status at last Eval: Flex & ABD 4-/5 Current Status: Flex & ABD 4/5 in standing  ER/IR 4+/5 in supine yes     Key Functional Changes/Progress: Mrs. Estefania Wade is currently 14 weeks post op (DOS 22), she continues to report minimal to no c/o pain.   She is now able to sleep on her L side for up to 2 hours at night & is able to use of L shoulder for light housework & yardwork. She reports 80% improvement overall & is progressing well with PT. She is awaiting a phone call from MD office to schedule follow up with MD regarding recent MRI results of R shoulder. Problem List: pain affecting function, decrease ROM, decrease strength, decrease ADL/ functional abilitiies, decrease activity tolerance, decrease flexibility/ joint mobility, and decrease transfer abilities   Treatment Plan may include any combination of the following: Therapeutic exercise, Therapeutic activities, Neuromuscular re-education, Physical agent/modality, Manual therapy, Aquatic therapy, Patient education, Self Care training, Functional mobility training, and Home safety training  Patient Goal(s) has been updated and includes: \"Return to normal ADLs\"   Goals for this certification period include and are to be achieved in   3-4  weeks:  1) Patient to report 75% improvement in overall function in preparation for return to recreational activities with manageable sx in L shoulder. 2) Improve overall strength of L shoulder to 4+/5 so strength is available for return to light lifting activities at work/home. 3) Improve L shoulder AROM for IR to functional level of L4 so ROM is available for dressing activities and/or overhead reaching. 4) Patient to be independent & compliant with HEP in preparation for D/C. Frequency / Duration:   Patient to be seen   3   times per week for   4-6    weeks:    Assessments/Recommendations: Patient to continue with PT for up to 3-4 more weeks in order to progress towards achieving all LTGs. If you have any questions/comments please contact us directly at [phone]. Thank you for allowing us to assist in the care of your patient.     Therapist Signature: Fran Bonilla, MANPREET, octaviano MDT Date: 13/0/8041   Certification Period:  Reporting Period: 10-04-22 to 1-02-23 9-06-22 to 10-04-22 Time: 8:36 AM   NOTE TO PHYSICIAN:  PLEASE COMPLETE THE ORDERS BELOW AND FAX TO   [facility]: [phone]. If you are unable to process this request in 24 hours please contact our office: [phone].    ___ I have read the above report and request that my patient continue as recommended.   ___ I have read the above report and request that my patient continue therapy with the following changes/special instructions: ________________________________________________   ___ I have read the above report and request that my patient be discharged from therapy.      Physician Signature:                                                             Date:                                     Time:                                                                       Soo Del Rio MD

## 2022-10-05 ENCOUNTER — HOSPITAL ENCOUNTER (OUTPATIENT)
Dept: PHYSICAL THERAPY | Age: 67
Discharge: HOME | End: 2022-10-05
Payer: MEDICARE

## 2022-10-05 PROCEDURE — 97530 THERAPEUTIC ACTIVITIES: CPT

## 2022-10-05 PROCEDURE — 97110 THERAPEUTIC EXERCISES: CPT

## 2022-10-05 PROCEDURE — 97140 MANUAL THERAPY 1/> REGIONS: CPT
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
## 2022-10-10 ENCOUNTER — HOSPITAL ENCOUNTER (OUTPATIENT)
Dept: PHYSICAL THERAPY | Age: 67
Discharge: HOME | End: 2022-10-10
Payer: MEDICARE

## 2022-10-10 PROCEDURE — 97112 NEUROMUSCULAR REEDUCATION: CPT

## 2022-10-10 PROCEDURE — 97140 MANUAL THERAPY 1/> REGIONS: CPT

## 2022-10-10 PROCEDURE — 97530 THERAPEUTIC ACTIVITIES: CPT

## 2022-10-10 PROCEDURE — 97110 THERAPEUTIC EXERCISES: CPT

## 2022-10-10 NOTE — PROGRESS NOTES
PHYSICAL THERAPY - DAILY TREATMENT NOTE    Patient Name: Maggy Syed        Date: 10/10/2022  : 1955   YES Patient  Verified  Visit #:   45   of   40  Insurance: Payor: Tomasaaimee Gilbert / Plan: [place] MEDICARE PART A & B / Product Type: Medicare /      In time: 7:40 A Out time: 8:55 A   Total Treatment Time: 65     BCBS/Medicare Time Tracking (below)   Total Timed Codes (min):  55 1:1 Treatment Time:  53     TREATMENT AREA =  Pain in left shoulder [M25.512]  SUBJECTIVE  Pain Level (on 0 to 10 scale):  0  / 10   Medication Changes/New allergies or changes in medical history, any new surgeries or procedures? NO    If yes, update Summary List   Subjective Functional Status/Changes:  []  No changes reported     Patient reports she did some work over the weekend painting wood for her fence, both arms were sore & she did ice afterwards but this was not painful.            Modalities Rationale:     decrease edema, decrease inflammation, and decrease pain to improve patient's ability to return to pain-free use of L shoulder with ADLs    min [] Estim, type/location:                                      []  att     []  unatt     []  w/US     []  w/ice    []  w/heat    min []  Mechanical Traction: type/lbs                   []  pro   []  sup   []  int   []  cont    []  before manual    []  after manual    min []  Ultrasound, settings/location:      min []  Iontophoresis w/ dexamethasone, location:                                               []  take home patch       []  in clinic   10 min [x]  Ice     []  Heat    location/position: Seated to L shoulder     min []  Vasopneumatic Device, press/temp:    If using vaso (only need to measure limb vaso being performed on)      pre-treatment girth :       post-treatment girth :       measured at (landmark location) :      min []  Other:    [] Skin assessment post-treatment (if applicable):    [x]  intact    [x]  redness- no adverse reaction                  []redness - adverse reaction:        25/  23 min Therapeutic Exercise:  [x]  See flow sheet   Rationale:      increase ROM and increase strength to improve the patients ability to return to pain-free use of L shoulder with lifting at home      15 min Therapeutic Activity: [x]  See flow sheet   Rationale:    increase ROM, increase strength, and increase proprioception to improve the patients ability to return to elevation with good scapular control    15 min Manual Therapy: PROM for flex/scaption, [email or web address] of [email or web address] in supine   Rationale:      decrease pain and increase ROM to improve patient's ability to return to pain-free reaching overhead to reach into kitchen shelf  The manual therapy interventions were performed at a separate and distinct time from the therapeutic activities interventions. Billed With/As:   [] TE   [] TA   [] Neuro   [] Self Care Patient Education: [x] Review HEP    [] Progressed/Changed HEP based on:   [] positioning   [] body mechanics   [] transfers   [] heat/ice application    [] other:      Other Objective/Functional Measures:    Increased to green band with rows & IR today     Post Treatment Pain Level (on 0 to 10) scale:   0  / 10     ASSESSMENT  Assessment/Changes in Function:     Good tolerance to all strength progressions, no increase in pain today      []  See Progress Note/Recertification   Patient will continue to benefit from skilled PT services to modify and progress therapeutic interventions, address functional mobility deficits, address ROM deficits, address strength deficits, analyze and address soft tissue restrictions, analyze and cue movement patterns, analyze and modify body mechanics/ergonomics, assess and modify postural abnormalities, and instruct in home and community integration to attain remaining goals.    Progress toward goals / Updated goals:    Progressing towards LTG 2      PLAN  []  Upgrade activities as tolerated YES Continue plan of care   []  Discharge due to :    [] Other:      Therapist: Migel Feliz, PT    Date: 10/10/2022 Time: 7:41 AM     Future Appointments   Date Time Provider Trev Ragsdale   10/12/2022  8:20 AM Mray Reeder, PT [facility]   10/17/2022  7:40 AM Rani Smith, PT [facility]   10/19/2022  8:20 AM Mary Reeder, PT [facility]   10/24/2022  7:40 AM Rani Smith, PT [facility]   10/27/2022  8:20 AM Mary Reeder, PT [facility]   10/31/2022  7:40 AM Rani Smith, PT [facility]   11/2/2022  8:20 AM Mary Reeder, PT [facility]   9/1/2023 11:40 AM Azucena Babb, NP [facility]

## 2022-10-12 ENCOUNTER — HOSPITAL ENCOUNTER (OUTPATIENT)
Dept: PHYSICAL THERAPY | Age: 67
Discharge: HOME | End: 2022-10-12
Payer: MEDICARE

## 2022-10-12 PROCEDURE — 97530 THERAPEUTIC ACTIVITIES: CPT

## 2022-10-12 PROCEDURE — 97140 MANUAL THERAPY 1/> REGIONS: CPT

## 2022-10-12 PROCEDURE — 97110 THERAPEUTIC EXERCISES: CPT

## 2022-10-12 NOTE — PROGRESS NOTES
PHYSICAL THERAPY - DAILY TREATMENT NOTE     Patient Name: Liliya Lezama        Date: 10/12/2022  : 1955   YES Patient  Verified  Visit #:   44   of   40  Insurance: Payor: Vivek Grew / Plan: [place] MEDICARE PART A & B / Product Type: Medicare /      In time: 36 Out time: 925   Total Treatment Time: 65     Medicare/BCBS Time Tracking (below)   Total Timed Codes (min):  55 1:1 Treatment Time:  55     TREATMENT AREA =  Pain in left shoulder [M25.512]    SUBJECTIVE    Pain Level (on 0 to 10 scale):  0  / 10   Medication Changes/New allergies or changes in medical history, any new surgeries or procedures?     NO    If yes, update Summary List   Subjective Functional Status/Changes:  []  No changes reported       Functional improvements: \"I did some raking yesterday and didn't have too much pain afterwards\"  Functional impairments: Decreased ROM and strength affecting ADL's         OBJECTIVE  Modalities Rationale:     decrease inflammation and decrease pain to improve patient's ability to perform ADL's w/o pain      min [] Estim, type/location:                                      []  att     []  unatt     []  w/US     []  w/ice    []  w/heat    min []  Mechanical Traction: type/lbs                   []  pro   []  sup   []  int   []  cont    []  before manual    []  after manual    min []  Ultrasound, settings/location:      min []  Iontophoresis w/ dexamethasone, location:                                               []  take home patch       []  in clinic   10 min [x]  Ice     []  Heat    location/position: Seated to rosemary shoulders following treatment session    min []  Vasopneumatic Device, press/temp:  If using vaso (only need to measure limb vaso being performed on)      pre-treatment girth :       post-treatment girth :       measured at (landmark location) :       min []  Other:    [x] Skin assessment post-treatment (if applicable):    [x]  intact    [x]  redness- no adverse reaction     []redness - adverse reaction:         25 min Therapeutic Exercise:  [x]  See flow sheet   Rationale:      increase ROM and increase strength to improve the patients ability to return to pain-free use of L shoulder with lifting at home       20 min Therapeutic Activity: [x]  See flow sheet   Rationale:    increase ROM, increase strength, and increase proprioception to improve the patients ability to return to elevation with good scapular control     10 min Manual Therapy: PROM for flex/scaption, [email or web address] of ABD in supine   Rationale:      decrease pain and increase ROM to improve patient's ability to return to pain-free reaching overhead to reach into kitchen cabinet. The manual therapy interventions were performed at a separate and distinct time from the therapeutic activities interventions. Billed With/As:   [x] TE   [x] TA   [] Neuro   [] Self Care Patient Education: [x] Review HEP    [] Progressed/Changed HEP based on:   [] positioning   [] body mechanics   [] transfers   [] heat/ice application    [] other:        Other Objective/Functional Measures:    Progressed per flow sheet     Post Treatment Pain Level (on 0 to 10) scale:   0  / 10     ASSESSMENT    Assessment/Changes in Function:     Improved scapular control. Continues to have decreased ROM with mild pain at end range flexion. Tolerated increased weight well w/ prone scapular exercises     []  See Progress Note/Recertification   Patient will continue to benefit from skilled PT services to modify and progress therapeutic interventions, address functional mobility deficits, address ROM deficits, address strength deficits, analyze and address soft tissue restrictions, analyze and cue movement patterns, analyze and modify body mechanics/ergonomics, and assess and modify postural abnormalities to attain remaining goals.       Progress toward goals / Updated goals:    1) Patient to report 75% improvement in overall function in preparation for return to recreational activities with manageable sx in L shoulder. Progressing well per verbal reports of increased independence w/ ADL's and yardwork  2) Improve overall strength of L shoulder to 4+/5 so strength is available for return to light lifting activities at work/home. Good Progress as noted by increased weight  3) Improve L shoulder AROM for IR to functional level of L4 so ROM is available for dressing activities and/or overhead reaching.   Progressing slowly     PLAN    [x]  Upgrade activities as tolerated YES Continue plan of care   []  Discharge due to :    []  Other:      Therapist: Fariba Gaviria PT    Date: 10/12/2022 Time: 8:03 AM     Future Appointments   Date Time Provider Trev Ragsdale   10/12/2022  8:20 AM Janell Montenegro, PT [facility]   10/17/2022  7:40 AM Tiffanie Fam, PT [facility]   10/19/2022  8:20 AM Janell Montenegro, PT [facility]   10/24/2022  7:40 AM Tiffanie Fam, PT [facility]   10/27/2022  8:20 AM Janell Montenegro, PT [facility]   10/31/2022  7:40 AM Tiffanie Fam, PT [facility]   11/2/2022  8:20 AM Janell Montenegro, PT [facility]   9/1/2023 11:40 AM Charis Babb, NP [facility]

## 2022-10-17 ENCOUNTER — HOSPITAL ENCOUNTER (OUTPATIENT)
Dept: PHYSICAL THERAPY | Age: 67
Discharge: HOME | End: 2022-10-17
Payer: MEDICARE

## 2022-10-17 PROCEDURE — 97110 THERAPEUTIC EXERCISES: CPT

## 2022-10-17 PROCEDURE — 97530 THERAPEUTIC ACTIVITIES: CPT

## 2022-10-17 PROCEDURE — 97140 MANUAL THERAPY 1/> REGIONS: CPT

## 2022-10-17 NOTE — PROGRESS NOTES
PHYSICAL THERAPY - DAILY TREATMENT NOTE    Patient Name: Vergie Kehr        Date: 10/17/2022  : 1955   YES Patient  Verified  Visit #:   36   of   40  Insurance: Payor: Daksha Judy / Plan: [place] MEDICARE PART A & B / Product Type: Medicare /      In time: 8:20 A Out time: 9:25 A   Total Treatment Time: 60     BCBS/Medicare Time Tracking (below)   Total Timed Codes (min):  50 1:1 Treatment Time:  45     TREATMENT AREA =  Pain in left shoulder [M25.512]  SUBJECTIVE  Pain Level (on 0 to 10 scale):  0  / 10   Medication Changes/New allergies or changes in medical history, any new surgeries or procedures? 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 Holding her arm up too long is difficult/can be tiring.           Modalities Rationale:     decrease edema, decrease inflammation, and decrease pain to improve patient's ability to return to pain-free use of L shoulder with ADLs    min [] Estim, type/location:                                      []  att     []  unatt     []  w/US     []  w/ice    []  w/heat    min []  Mechanical Traction: type/lbs                   []  pro   []  sup   []  int   []  cont    []  before manual    []  after manual    min []  Ultrasound, settings/location:      min []  Iontophoresis w/ dexamethasone, location:                                               []  take home patch       []  in clinic   10 min [x]  Ice     []  Heat    location/position: Seated to L shoulder     min []  Vasopneumatic Device, press/temp:    If using vaso (only need to measure limb vaso being performed on)      pre-treatment girth :       post-treatment girth :       measured at (landmark location) :      min []  Other:    [] Skin assessment post-treatment (if applicable):    [x]  intact    [x]  redness- no adverse reaction                  []redness - adverse reaction:        20 min Therapeutic Exercise:  [x]  See flow sheet   Rationale:      increase ROM and increase strength to improve the patients ability to return to pain-free use of L shoulder with lifting at home      15/  10 min Therapeutic Activity: [x]  See flow sheet   Rationale:    increase ROM, increase strength, and increase proprioception to improve the patients ability to return to elevation with good scapular mechanics    15 min Manual Therapy: PROM for flex/scaption, [email or web address] of ABD in supine   Rationale:      decrease pain and increase ROM to improve patient's ability to return to pain-free reaching overhead with dressing  The manual therapy interventions were performed at a separate and distinct time from the therapeutic activities interventions. Billed With/As:   [] TE   [] TA   [] Neuro   [] Self Care Patient Education: [x] Review HEP    [] Progressed/Changed HEP based on:   [] positioning   [] body mechanics   [] transfers   [] heat/ice application    [] other:      Other Objective/Functional Measures:    See flow sheet     Post Treatment Pain Level (on 0 to 10) scale:   0  / 10     ASSESSMENT  Assessment/Changes in Function:     No increase in pain today      []  See Progress Note/Recertification   Patient will continue to benefit from skilled PT services to modify and progress therapeutic interventions, address functional mobility deficits, address ROM deficits, address strength deficits, analyze and address soft tissue restrictions, analyze and cue movement patterns, analyze and modify body mechanics/ergonomics, assess and modify postural abnormalities, and instruct in home and community integration to attain remaining goals.    Progress toward goals / Updated goals:    Progressing towards LTG 2 & 3     PLAN  []  Upgrade activities as tolerated YES Continue plan of care   []  Discharge due to :    []  Other:      Therapist: Kenroy Esqueda, PT    Date: 10/17/2022 Time: 9:25 AM Future Appointments   Date Time Provider Trev Ragsdale   10/19/2022  8:20 AM Mora Kennedy, PT [facility] [place] [facility]   10/24/2022  7:40 AM Michelle Morales, PT [facility]   10/27/2022  8:20 AM [facility] [place] [facility]   10/31/2022  7:40 AM Michelle Morales, PT [facility]   11/2/2022  8:20 AM Mora Kennedy, PT [facility]   11/8/2022  7:40 AM Michelle Morales, PT [facility]   11/10/2022  7:20 AM Michelle Morales, PT [facility]   11/14/2022  7:40 AM Michelle Morales, PT [facility]   11/16/2022  8:20 AM Mora Kennedy, PT [facility]   11/21/2022  7:40 AM Michelle Morales, PT [facility]   11/23/2022  8:20 AM [facility] Kennedy, [facility] [place] [facility]   11/28/2022  7:40 AM [facility]ales, PT [facility]   9/1/2023 11:40 AM Elizabeth Babbal, NP [address] [facility]

## 2022-10-19 ENCOUNTER — HOSPITAL ENCOUNTER (OUTPATIENT)
Dept: PHYSICAL THERAPY | Age: 67
Discharge: HOME | End: 2022-10-19
Payer: MEDICARE

## 2022-10-19 PROCEDURE — 97530 THERAPEUTIC ACTIVITIES: CPT

## 2022-10-19 PROCEDURE — 97140 MANUAL THERAPY 1/> REGIONS: CPT

## 2022-10-19 PROCEDURE — 97110 THERAPEUTIC EXERCISES: CPT

## 2022-10-19 NOTE — PROGRESS NOTES
PHYSICAL THERAPY - DAILY TREATMENT NOTE     Patient Name: Valentín Peck        Date: 10/19/2022  : 1955   YES Patient  Verified  Visit #:   39   of   55  Insurance: Payor: Raj Chowdhury / Plan: [place] MEDICARE PART A & B / Product Type: Medicare /      In time: 36 Out time: 910   Total Treatment Time: 50     Medicare/BCBS Time Tracking (below)   Total Timed Codes (min):  40 1:1 Treatment Time:  40     TREATMENT AREA =  Pain in left shoulder [M25.512]    SUBJECTIVE    Pain Level (on 0 to 10 scale):  0  / 10   Medication Changes/New allergies or changes in medical history, any new surgeries or procedures? NO    If yes, update Summary List   Subjective Functional Status/Changes:  []  No changes reported       Functional improvements: \"I wasn't too sore after working on the fence. \"  Functional impairments: Decreased ROM and strength affecting ADL's         OBJECTIVE  Modalities Rationale:     decrease inflammation and decrease pain to improve patient's ability to perform ADL's w/o pain      min [] Estim, type/location:                                      []  att     []  unatt     []  w/US     []  w/ice    []  w/heat    min []  Mechanical Traction: type/lbs                   []  pro   []  sup   []  int   []  cont    []  before manual    []  after manual    min []  Ultrasound, settings/location:      min []  Iontophoresis w/ dexamethasone, location:                                               []  take home patch       []  in clinic   10 min [x]  Ice     []  Heat    location/position: Seated to rosemary shoulders following treatment session    min []  Vasopneumatic Device, press/temp:  If using vaso (only need to measure limb vaso being performed on)      pre-treatment girth :       post-treatment girth :       measured at (landmark location) :       min []  Other:    [x] Skin assessment post-treatment (if applicable):    [x]  intact    [x]  redness- no adverse reaction     []redness - adverse reaction:      15 min Therapeutic Exercise:  [x]  See flow sheet   Rationale:      increase ROM and increase strength to improve the patients ability to return to pain-free use of L shoulder with lifting at home       15 min Therapeutic Activity: [x]  See flow sheet   Rationale:    increase ROM, increase strength, and increase proprioception to improve the patients ability to return to elevation with good scapular mechanics     10 min Manual Therapy: PROM for flex/scaption, ER@ 90 deg of ABD in supine. Contract relax for IR   Rationale:      decrease pain and increase ROM to improve patient's ability to return to pain-free reaching overhead with dressing  The manual therapy interventions were performed at a separate and distinct time from the therapeutic activities interventions. Billed With/As:   [x] TE   [x] TA   [] Neuro   [] Self Care Patient Education: [x] Review HEP    [] Progressed/Changed HEP based on:   [] positioning   [] body mechanics   [] transfers   [] heat/ice application    [] other:        Other Objective/Functional Measures:    Progressed per flow sheet     Post Treatment Pain Level (on 0 to 10) scale:   0  / 10     ASSESSMENT    Assessment/Changes in Function:     Achieved full IR following contract relax. Tolerated increased repetitions well w/ good mechanics at the shoulder     []  See Progress Note/Recertification   Patient will continue to benefit from skilled PT services to modify and progress therapeutic interventions, address functional mobility deficits, address ROM deficits, address strength deficits, analyze and address soft tissue restrictions, analyze and cue movement patterns, analyze and modify body mechanics/ergonomics, and assess and modify postural abnormalities to attain remaining goals.       Progress toward goals / Updated goals:    Progressing towards LTG #2&3     PLAN    [x]  Upgrade activities as tolerated YES Continue plan of care   []  Discharge due to :    []  Other:      Therapist: Niranjan Schaffer, PT    Date: 10/19/2022 Time: 7:53 AM     Future Appointments   Date Time Provider Trev Melyssa   10/19/2022  8:20 AM Pau Mcfadden, PT [facility]   10/24/2022  7:40 AM Anirudh Mend, PT [facility]   10/27/2022  8:20 AM Pau Mcfadden, PT [facility]   10/31/2022  7:40 AM Anirudh Mend, PT [facility]   11/2/2022  8:20 AM Pau Mcfadden, PT [facility]   11/8/2022  7:40 AM Anirudh Mend, PT [facility]   11/10/2022  7:20 AM Anirudh Mend, PT [facility]   11/14/2022  7:40 AM Anirudh Mend, PT [facility]   11/16/2022  8:20 AM Pau Mcfadden, PT [facility]   11/21/2022  7:40 AM Anirudh Mend, PT [facility]   11/23/2022  8:20 AM Pau Mcfadden, PT [facility]   11/28/2022  7:40 AM Anirudh Mend, PT [facility]   9/1/2023 11:40 AM Giuliana Babb, NP [id number]

## 2022-10-24 ENCOUNTER — HOSPITAL ENCOUNTER (OUTPATIENT)
Dept: PHYSICAL THERAPY | Age: 67
Discharge: HOME | End: 2022-10-24
Payer: MEDICARE

## 2022-10-24 PROCEDURE — 97110 THERAPEUTIC EXERCISES: CPT

## 2022-10-24 PROCEDURE — 97530 THERAPEUTIC ACTIVITIES: CPT

## 2022-10-24 PROCEDURE — 97140 MANUAL THERAPY 1/> REGIONS: CPT

## 2022-10-24 NOTE — PROGRESS NOTES
PHYSICAL THERAPY - DAILY TREATMENT NOTE    Patient Name: Gale De Santiago        Date: 10/24/2022  : 1955   YES Patient  Verified  Visit #:   43   of   55  Insurance: Payor: Debbie Kong / Plan: [place] MEDICARE PART A & B / Product Type: Medicare /      In time: 7:35 A Out time: 8:50 A   Total Treatment Time: 70     BCBS/Medicare Time Tracking (below)   Total Timed Codes (min):  60 1:1 Treatment Time:  40     TREATMENT AREA =  Pain in left shoulder [M25.512]    SUBJECTIVE  Pain Level (on 0 to 10 scale):  0  / 10   Medication Changes/New allergies or changes in medical history, any new surgeries or procedures? NO    If yes, update Summary List   Subjective Functional Status/Changes:  []  No changes reported     Patient reports she is now able to reach behind her back much higher, she typically does better after she showers when she stretches.             Modalities Rationale:     decrease edema, decrease inflammation, and decrease pain to improve patient's ability to return to pain-free ADLs    min [] Estim, type/location:                                      []  att     []  unatt     []  w/US     []  w/ice    []  w/heat    min []  Mechanical Traction: type/lbs                   []  pro   []  sup   []  int   []  cont    []  before manual    []  after manual    min []  Ultrasound, settings/location:      min []  Iontophoresis w/ dexamethasone, location:                                               []  take home patch       []  in clinic   10 min [x]  Ice     []  Heat    location/position: Seated to L shoulder     min []  Vasopneumatic Device, press/temp:    If using vaso (only need to measure limb vaso being performed on)      pre-treatment girth :       post-treatment girth :       measured at (landmark location) :      min []  Other:    [] Skin assessment post-treatment (if applicable):    [x]  intact    [x]  redness- no adverse reaction                  []redness - adverse reaction:        30/  20 min Therapeutic Exercise:  [x]  See flow sheet   Rationale:      increase ROM and increase strength to improve the patients ability to return to light lifting     20/  10 min Therapeutic Activity: [x]  See flow sheet   Rationale:    increase ROM and increase strength to improve the patients ability to return to dressing    10 min Manual Therapy: Gentle PROM for flex/scaption, Felicia@[email or web address] of ABD in supine   Rationale:      decrease pain and increase ROM to improve patient's ability to return to reaching overhead  The manual therapy interventions were performed at a separate and distinct time from the therapeutic activities interventions. Billed With/As:   [] TE   [] TA   [] Neuro   [] Self Care Patient Education: [x] Review HEP    [] Progressed/Changed HEP based on:   [] positioning   [] body mechanics   [] transfers   [] heat/ice application    [] other:      Other Objective/Functional Measures: Added quadraped weight shifting (alternate UE)     Post Treatment Pain Level (on 0 to 10) scale:   0  / 10     ASSESSMENT  Assessment/Changes in Function:   Good tolerance to closed chain strengthening progressions, discussed DC planning with patient     []  See Progress Note/Recertification   Patient will continue to benefit from skilled PT services to modify and progress therapeutic interventions, address functional mobility deficits, address ROM deficits, address strength deficits, analyze and address soft tissue restrictions, analyze and cue movement patterns, analyze and modify body mechanics/ergonomics, assess and modify postural abnormalities, and instruct in home and community integration to attain remaining goals.    Progress toward goals / Updated goals:    Progressing towards LTG 2      PLAN  []  Upgrade activities as tolerated YES Continue plan of care   []  Discharge due to :    [x]  Other: Plan to DC next week     Therapist: Rosy Dyson PT    Date: 10/24/2022 Time: 8:47 AM     Future Appointments   Date Time Provider Trev Ragsdale   10/27/2022  8:20 AM Vicki Craven, PT [facility]   10/31/2022  7:40 AM June Pair, PT [facility]   11/2/2022  8:20 AM Vicki Craven, PT [facility]   11/8/2022  7:40 AM June Pair, PT [facility]   11/10/2022  7:20 AM June Pair, PT [facility]   11/14/2022  7:40 AM June Pair, PT [facility]   11/16/2022  8:20 AM Vicki Craven, PT [facility]   11/21/2022  7:40 AM June Pair, PT [facility]   11/23/2022  8:20 AM Vicki Craven, PT [facility]   11/28/2022  7:40 AM June Pair, PT [facility]   9/1/2023 11:40 AM Denise Babb, NP [address] [facility]

## 2022-10-26 ENCOUNTER — APPOINTMENT (OUTPATIENT)
Dept: PHYSICAL THERAPY | Age: 67
End: 2022-10-26
Payer: MEDICARE

## 2022-10-27 ENCOUNTER — HOSPITAL ENCOUNTER (OUTPATIENT)
Dept: PHYSICAL THERAPY | Age: 67
Discharge: HOME | End: 2022-10-27
Payer: MEDICARE

## 2022-10-27 PROCEDURE — 97110 THERAPEUTIC EXERCISES: CPT

## 2022-10-27 PROCEDURE — 97530 THERAPEUTIC ACTIVITIES: CPT

## 2022-10-27 PROCEDURE — 97140 MANUAL THERAPY 1/> REGIONS: CPT

## 2022-10-27 NOTE — PROGRESS NOTES
PHYSICAL THERAPY - DAILY TREATMENT NOTE     Patient Name: Maria G Majano        Date: 10/27/2022  : 1955   YES Patient  Verified  Visit #:   37   of   55  Insurance: Payor: Dora Bolivar / Plan: [place] MEDICARE PART A & B / Product Type: Medicare /      In time: 36 Out time: 930   Total Treatment Time: 70     Medicare/BCBS Time Tracking (below)   Total Timed Codes (min):  60 1:1 Treatment Time:  60     TREATMENT AREA =  Pain in left shoulder [M25.512]    SUBJECTIVE    Pain Level (on 0 to 10 scale):  0  / 10   Medication Changes/New allergies or changes in medical history, any new surgeries or procedures?     NO    If yes, update Summary List   Subjective Functional Status/Changes:  []  No changes reported       Functional improvements: \"I'm doing really well\"  Functional impairments: Decreased end range ROM and strength affecting independence with all ADL's         OBJECTIVE  Modalities Rationale:     decrease inflammation and decrease pain to improve patient's ability to perform ADL's w/o pain      min [] Estim, type/location:                                      []  att     []  unatt     []  w/US     []  w/ice    []  w/heat    min []  Mechanical Traction: type/lbs                   []  pro   []  sup   []  int   []  cont    []  before manual    []  after manual    min []  Ultrasound, settings/location:      min []  Iontophoresis w/ dexamethasone, location:                                               []  take home patch       []  in clinic   10 min [x]  Ice     []  Heat    location/position: Seated to L shoulder    min []  Vasopneumatic Device, press/temp:  If using vaso (only need to measure limb vaso being performed on)      pre-treatment girth :       post-treatment girth :       measured at (landmark location) :       min []  Other:    [x] Skin assessment post-treatment (if applicable):    [x]  intact    [x]  redness- no adverse reaction     []redness - adverse reaction:      30 min Therapeutic Exercise: [x]  See flow sheet   Rationale:      increase ROM and increase strength to improve the patients ability to return to light lifting      20 min Therapeutic Activity: [x]  See flow sheet   Rationale:    increase ROM and increase strength to improve the patients ability to return to dressing     10 min Manual Therapy: Gentle PROM for flex/scaption, Starla[employer] of ABD in supine   Rationale:      decrease pain and increase ROM to improve patient's ability to return to reaching overhead  The manual therapy interventions were performed at a separate and distinct time from the therapeutic activities interventions. Billed With/As:   [x] TE   [x] TA   [] Neuro   [] Self Care Patient Education: [x] Review HEP    [] Progressed/Changed HEP based on:   [] positioning   [] body mechanics   [] transfers   [] heat/ice application    [] other:        Other Objective/Functional Measures:    Progressed per flow sheet     Post Treatment Pain Level (on 0 to 10) scale:   0  / 10     ASSESSMENT    Assessment/Changes in Function:     Full PROM all planes except IR with tightness at end range. Patient tolerated increased weight well without compensation. Progressing towards d/c next week. []  See Progress Note/Recertification   Patient will continue to benefit from skilled PT services to modify and progress therapeutic interventions, address functional mobility deficits, address ROM deficits, address strength deficits, analyze and address soft tissue restrictions, analyze and cue movement patterns, analyze and modify body mechanics/ergonomics, and assess and modify postural abnormalities to attain remaining goals.       Progress toward goals / Updated goals:    Progressing towards all LTG's     PLAN    [x]  Upgrade activities as tolerated YES Continue plan of care   []  Discharge due to :    [x]  Other: 2 more visits     Therapist: Kira Lara PT    Date: 10/27/2022 Time: 7:48 AM     Future Appointments   Date Time Provider Trev Melyssa   10/27/2022  8:20 AM Parul Purchase, PT [facility]   10/31/2022  7:40 AM Lowanda , PT [facility]   11/2/2022  8:20 AM Parul Purchase, PT [facility]   11/8/2022  7:40 AM Lowanda , PT [facility]   11/10/2022  7:20 AM Lowanda , PT [facility]   11/14/2022  7:40 AM Lowanda , PT [facility]   11/16/2022  8:20 AM Parul Purchase, PT [facility]   11/21/2022  7:40 AM Lowanda , PT [facility]   11/23/2022  8:20 AM Parul Purchase, PT [facility]   11/28/2022  7:40 AM Lowanda , PT [facility]   9/1/2023 11:40 AM Diamond Babb, NP [address]

## 2022-10-31 ENCOUNTER — HOSPITAL ENCOUNTER (OUTPATIENT)
Dept: PHYSICAL THERAPY | Age: 67
Discharge: HOME | End: 2022-10-31
Payer: MEDICARE

## 2022-10-31 PROCEDURE — 97110 THERAPEUTIC EXERCISES: CPT

## 2022-10-31 PROCEDURE — 97530 THERAPEUTIC ACTIVITIES: CPT

## 2022-10-31 PROCEDURE — 97140 MANUAL THERAPY 1/> REGIONS: CPT

## 2022-10-31 NOTE — PROGRESS NOTES
PHYSICAL THERAPY - DAILY TREATMENT NOTE    Patient Name: Guy Echeverria        Date: 10/31/2022  : 1955   YES Patient  Verified  Visit #:   39   of   55  Insurance: Payor: [place] / Plan: [place] MED[place] PART A & B / Product Type: Medicare /      In time: 7:40 A Out time: 8:55 A   Total Treatment Time: 70     BCBS/Medicare Time Tracking (below)   Total Timed Codes (min):  60 1:1 Treatment Time:  40     TREATMENT AREA =  Pain in left shoulder [M25.512]    SUBJECTIVE  Pain Level (on 0 to 10 scale):  0  / 10   Medication Changes/New allergies or changes in medical history, any new surgeries or procedures? NO    If yes, update Summary List   Subjective Functional Status/Changes:  []  No changes reported     Patient reports 80% improvement overall, she only has some tightness when she reaches behind her back.            Modalities Rationale:     decrease edema, decrease inflammation, and decrease pain to improve patient's ability to return to pain-free ADLs   min [] Estim, type/location:                                      []  att     []  unatt     []  w/US     []  w/ice    []  w/heat    min []  Mechanical Traction: type/lbs                   []  pro   []  sup   []  int   []  cont    []  before manual    []  after manual    min []  Ultrasound, settings/location:      min []  Iontophoresis w/ dexamethasone, location:                                               []  take home patch       []  in clinic   10 min [x]  Ice     []  Heat    location/position: Seated to L shoulder     min []  Vasopneumatic Device, press/temp:    If using vaso (only need to measure limb vaso being performed on)      pre-treatment girth :       post-treatment girth :       measured at (landmark location) :      min []  Other:    [] Skin assessment post-treatment (if applicable):    [x]  intact    [x]  redness- no adverse reaction                  []redness - adverse reaction:        25/  15 min Therapeutic Exercise:  [x]  See flow sheet   Rationale:      increase ROM and increase strength to improve the patients ability to return to pain-free lifting using L UE     20/  10 min Therapeutic Activity: [x]  See flow sheet   Rationale:    increase ROM and increase strength to improve the patients ability to return to yard work without limitations     15 min Manual Therapy: PROM for gentle flex/scaption, [email or web address] of [email or web address] in supine,    Rationale:      decrease pain and increase ROM to improve patient's ability to return to dressing without limitations   The manual therapy interventions were performed at a separate and distinct time from the therapeutic activities interventions. Billed With/As:   [] TE   [] TA   [] Neuro   [] Self Care Patient Education: [x] Review HEP    [] Progressed/Changed HEP based on:   [] positioning   [] body mechanics   [] transfers   [] heat/ice application    [] other:      Other Objective/Functional Measures:    AROM: flex/scaption: 145 degrees in standing, IR to functional level of L4 actively (~T12 with use of belt for overpressure)  FOTO 61 points     Post Treatment Pain Level (on 0 to 10) scale:   0  / 10     ASSESSMENT  Assessment/Changes in Function:     Discussed DC instructions with patient (see updated HEP), reviewed pictures with patient as well as progressions      []  See Progress Note/Recertification   Patient will continue to benefit from skilled PT services to modify and progress therapeutic interventions, address functional mobility deficits, address ROM deficits, address strength deficits, analyze and address soft tissue restrictions, analyze and cue movement patterns, analyze and modify body mechanics/ergonomics, assess and modify postural abnormalities, and instruct in home and community integration to attain remaining goals. Progress toward goals / Updated goals:     All LTGs met      PLAN  []  Upgrade activities as tolerated YES Continue plan of care   []  Discharge due to :    [x]  Other: Plan to MARILUZ n/v      Therapist: Beba Rush, PT    Date: 10/31/2022 Time: 8:41 AM     Future Appointments   Date Time Provider Trev Ragsdale   11/2/2022  8:20 AM Cynthia Arriaza PT [facility]   9/1/2023 11:40 AM Beba Babb, NP [id number] [facility]

## 2022-11-02 ENCOUNTER — HOSPITAL ENCOUNTER (OUTPATIENT)
Dept: PHYSICAL THERAPY | Age: 67
Discharge: HOME | End: 2022-11-02
Payer: MEDICARE

## 2022-11-02 PROCEDURE — 97140 MANUAL THERAPY 1/> REGIONS: CPT

## 2022-11-02 PROCEDURE — 97530 THERAPEUTIC ACTIVITIES: CPT

## 2022-11-02 PROCEDURE — 97110 THERAPEUTIC EXERCISES: CPT

## 2022-11-02 NOTE — PROGRESS NOTES
[facility] [address] [place] [address] [address], [address] [place] [address], [address]  Phone: [phone]  Fax: [phone] SUMMARY FOR PHYSICAL THERAPY          Patient Name: Maggy Syed : 1955   Treatment/Medical Diagnosis: Pain in left shoulder [M25.512]   Onset Date: 22    Referral Source: Sofya Lopez MD Start of Care [facility]): 22   Prior Hospitalization: See Medical History Provider #: [id number]   Prior Level of Function: Painful overhead reaching, able to lift 40 lb bags of mulch and garden, L hand dominant   Comorbidities: Arthritis, depression, HTN   Medications: Verified on Patient Summary List   Vis[facility] from [facility]: 45 Missed Visits: 0     Previous Goals:  1) Patient to report 75% improvement in overall function in preparation for return to recreational activities with manageable sx in L shoulder. 2) Improve overall strength of L shoulder to 4+/5 so strength is available for return to light lifting activities at work/home. 3) Improve L shoulder AROM for IR to functional level of L4 so ROM is available for dressing activities and/or overhead reaching. 4) Patient to be independent & compliant with HEP in preparation for D/C. Prior Level/Current Level:  1) Prior Level: onging   Current Level: >75% improved; Independent with ADL's and yard work/housework on L   Goal Met? yes  2) Prior Level: 4-/5   Current Level: 4+/5-5-/5   Goal Met? yes  3) Prior Level: Level of lateral hip   Current Level: T10   Goal Met? yes  4) Prior Level: ongoing   Current Level: Independent with current HEP and it's progression   Goal Met? yes    Key Functional Changes/Progress: Ms. Shireen Travis has made excellent progress in her physical therapy consistently reporting improved functional use and decreased pain with activity. She is currently independent on the L with all ADL's and house/yard work along with her HEP and it's progression.  Her ROM is WFL.    Assessments/Recommendations: Discontinue therapy. Progressing towards or have reached established goals. If you have any questions/comments please contact us directly at [phone]. Thank you for allowing us to assist in the care of your patient.     Therapist Signature: Sissy Haley PT Date: 11/2/22   Reporting Period: 10/4/22 to 11/2/22 Time: 7:58 AM

## 2022-11-02 NOTE — PROGRESS NOTES
PHYSICAL THERAPY - DAILY TREATMENT NOTE     Patient Name: Rufino Sanchez        Date: 2022  : 1955   YES Patient  Verified  Visit #:   55   of   55  Insurance: Payor: Adrian Osborn / Plan: [place] MEDICARE PART A & B / Product Type: Medicare /      In time: 36 Out time: 930   Total Treatment Time: 70     Medicare/BCBS Time Tracking (below)   Total Timed Codes (min):  60 1:1 Treatment Time:  55     TREATMENT AREA =  Pain in left shoulder [M25.512]    SUBJECTIVE    Pain Level (on 0 to 10 scale):  0  / 10   Medication Changes/New allergies or changes in medical history, any new surgeries or procedures?     NO    If yes, update Summary List   Subjective Functional Status/Changes:  []  No changes reported     \"I'm doing great\"         OBJECTIVE  Modalities Rationale:     decrease inflammation and decrease pain to improve patient's ability to perform ADL's w/o pain      min [] Estim, type/location:                                      []  att     []  unatt     []  w/US     []  w/ice    []  w/heat    min []  Mechanical Traction: type/lbs                   []  pro   []  sup   []  int   []  cont    []  before manual    []  after manual    min []  Ultrasound, settings/location:      min []  Iontophoresis w/ dexamethasone, location:                                               []  take home patch       []  in clinic   10 min [x]  Ice     []  Heat    location/position: Seated to left shoulder    min []  Vasopneumatic Device, press/temp:  If using vaso (only need to measure limb vaso being performed on)      pre-treatment girth :       post-treatment girth :       measured at (landmark location) :       min []  Other:    [x] Skin assessment post-treatment (if applicable):    [x]  intact    [x]  redness- no adverse reaction     []redness - adverse reaction:      25 min Therapeutic Exercise:  [x]  See flow sheet   Rationale:      increase ROM and increase strength to improve the patients ability to return to pain-free lifting using L UE      20/5 min Therapeutic Activity: [x]  See flow sheet   Rationale:    increase ROM and increase strength to improve the patients ability to return to yard work without limitations      10 min Manual Therapy: PROM for gentle flex/scaption, [email or web address] of [email or web address] in supine,    Rationale:      decrease pain and increase ROM to improve patient's ability to return to dressing without limitations   The manual therapy interventions were performed at a separate and distinct time from the therapeutic activities interventions. Billed With/As:   [x] TE   [x] TA   [] Neuro   [] Self Care Patient Education: [x] Review HEP    [] Progressed/Changed HEP based on:   [] positioning   [] body mechanics   [] transfers   [] heat/ice application    [] other:        Other Objective/Functional Measures:    See D/C summary     Post Treatment Pain Level (on 0 to 10) scale:   0  / 10     ASSESSMENT    Assessment/Changes in Function:     See D/C summary     []  See Progress Note/Recertification   Patient will continue to benefit from skilled PT services to modify and progress therapeutic interventions, address functional mobility deficits, address ROM deficits, address strength deficits, analyze and address soft tissue restrictions, analyze and cue movement patterns, analyze and modify body mechanics/ergonomics, and assess and modify postural abnormalities to attain remaining goals. Progress toward goals / Updated goals:    See D/C summary     PLAN    []  Upgrade activities as tolerated NO Continue plan of care   [x]  Discharge due to :  All Goals Met   []  Other:      Therapist: Michelle Vigil PT    Date: 11/2/2022 Time: 7:56 AM     Future Appointments   Date Time Provider Trev Ragsdale   11/2/2022  8:20 AM Mello Amaya PT [facility]   9/1/2023 11:40 AM FARTUN Lemon

## 2022-11-08 ENCOUNTER — APPOINTMENT (OUTPATIENT)
Dept: PHYSICAL THERAPY | Age: 67
End: 2022-11-08
Payer: MEDICARE

## 2022-11-10 ENCOUNTER — APPOINTMENT (OUTPATIENT)
Dept: PHYSICAL THERAPY | Age: 67
End: 2022-11-10
Payer: MEDICARE

## 2022-11-14 ENCOUNTER — APPOINTMENT (OUTPATIENT)
Dept: PHYSICAL THERAPY | Age: 67
End: 2022-11-14
Payer: MEDICARE

## 2022-11-16 ENCOUNTER — APPOINTMENT (OUTPATIENT)
Dept: PHYSICAL THERAPY | Age: 67
End: 2022-11-16
Payer: MEDICARE

## 2022-11-21 ENCOUNTER — APPOINTMENT (OUTPATIENT)
Dept: PHYSICAL THERAPY | Age: 67
End: 2022-11-21
Payer: MEDICARE

## 2022-11-23 ENCOUNTER — APPOINTMENT (OUTPATIENT)
Dept: PHYSICAL THERAPY | Age: 67
End: 2022-11-23
Payer: MEDICARE

## 2022-11-28 ENCOUNTER — APPOINTMENT (OUTPATIENT)
Dept: PHYSICAL THERAPY | Age: 67
End: 2022-11-28
Payer: MEDICARE

## 2023-10-18 NOTE — PROGRESS NOTES
PHYSICAL THERAPY - DAILY TREATMENT NOTE    Patient Name: Nathaniel Francisco        Date: 2022  : 1955   YES Patient  Verified  Visit #:     Insurance: Payor: Danielito Elam / Plan: [place] MEDICARE PART A & B / Product Type: Medicare /      In time: 930 Out time: 3469   Total Treatment Time: 55     BCBS/Medicare Time Tracking (below)   Total Timed Codes (min):  45 1:1 Treatment Time:  45     TREATMENT AREA =  Pain in left shoulder [M25.512]    SUBJECTIVE  Pain Level (on 0 to 10 scale):  0  / 10   Medication Changes/New allergies or changes in medical history, any new surgeries or procedures? NO    If yes, update Summary List   Subjective Functional Status/Changes:  []  No changes reported     I had some soreness after our first visit but it only lasted into the evening. Modalities Rationale:     decrease edema, decrease inflammation, and decrease pain to improve patient's ability to perform pain-free ADLs.     min [] Estim, type/location:                                      []  att     []  unatt     []  w/US     []  w/ice    []  w/heat    min []  Mechanical Traction: type/lbs                   []  pro   []  sup   []  int   []  cont    []  before manual    []  after manual    min []  Ultrasound, settings/location:      min []  Iontophoresis w/ dexamethasone, location:                                               []  take home patch       []  in clinic   10 min [x]  Ice     []  Heat    location/position: L shoulder supine    min []  Vasopneumatic Device, press/temp:    If using vaso (only need to measure limb vaso being performed on)      pre-treatment girth :       post-treatment girth :       measured at (landmark location) :      min []  Other:    [] Skin assessment post-treatment (if applicable):    []  intact    []  redness- no adverse reaction                  []redness - adverse reaction:        30 min Therapeutic Exercise:  [x]  See flow sheet   Rationale:      increase ROM, increase strength, improve coordination, and increase proprioception to improve the patients ability to perform unlimited ADLs. 15 min Manual Therapy: PROM and stretching of L shoulder into flexion and ER with oscillations to promote muscle relaxation  STM/MFR R shoulder RC musculature   Rationale:      decrease pain, increase ROM, increase tissue extensibility, and decrease edema  to improve patient's ability to improve ROM per protocol  The manual therapy interventions were performed at a separate and distinct time from the therapeutic activities interventions. Billed With/As:   [x] TE   [] TA   [] Neuro   [] Self Care Patient Education: [x] Review HEP    [] Progressed/Changed HEP based on:   [] positioning   [] body mechanics   [] transfers   [] heat/ice application    [] other:      Other Objective/Functional Measures:    Performed [place] ER at 0 and 45 deg ABD    Added post capsule stretch with ER     Post Treatment Pain Level (on 0 to 10) scale:   0  / 10     ASSESSMENT  Assessment/Changes in Function:     Pt demonstrated improved motion on arrival this morning; reports performing HEP this AM due to stiffness in the morning. Able to achieve posterior capsule stretch with cross body stretching without anterior shoulder discomfort. []  See Progress Note/Recertification   Patient will continue to benefit from skilled PT services to modify and progress therapeutic interventions, address functional mobility deficits, address ROM deficits, address strength deficits, analyze and address soft tissue restrictions, analyze and cue movement patterns, analyze and modify body mechanics/ergonomics, and assess and modify postural abnormalities to attain remaining goals. Progress toward goals / Updated goals:    Progressing towards LTG 3.       PLAN  [x]  Upgrade activities as tolerated YES Continue plan of care   []  Discharge due to :    []  Other:      Therapist: Olivier Shrama DPT    Date: 8/12/2022 Time: 7:20 AM     Future Appointments   Date Time Provider Trev Leoi   8/15/2022  7:15 AM Dominic Lyme, PT [facility]   8/17/2022  7:15 AM [facility], [facility]   8/19/2022  7:15 AM Dominic Lyme, PT [facility]   8/22/2022  7:15 AM Dominic Lyme, PT [facility]   8/24/2022  8:30 AM Maximino Phillips, PT [facility]   8/29/2022  7:15 AM Dominic Lyme, PT [facility]   8/31/2022  8:00 AM Arnulfo Cuenca [facility]   9/1/2022 11:20 AM Jen Babb, NP [facility]   9/6/2022  8:00 AM Dominic Bernadette, PT [facility]   9/8/2022  8:00 AM Dominic Lyme, PT [facility]   9/12/2022  8:00 AM Dominic Bernadette, PT [facility]   9/14/2022  8:00 AM Arnulfo Cuenca [facility]   9/19/2022  8:00 AM Dominic Lyme, PT [facility]   9/21/2022  8:00 AM Arnulfo Cuenca [facility]   9/26/2022  8:00 AM Dominic Lyme, PT [facility]   9/28/2022  8:00 AM Arnulfo Cuenca [facility] 18-Oct-2023 21:55